# Patient Record
Sex: FEMALE | Race: WHITE | NOT HISPANIC OR LATINO | Employment: PART TIME | URBAN - METROPOLITAN AREA
[De-identification: names, ages, dates, MRNs, and addresses within clinical notes are randomized per-mention and may not be internally consistent; named-entity substitution may affect disease eponyms.]

---

## 2018-08-22 ENCOUNTER — CONVERSION ENCOUNTER (OUTPATIENT)
Dept: SURGERY | Facility: CLINIC | Age: 38
End: 2018-08-22

## 2018-08-22 ENCOUNTER — OFFICE VISIT CONVERTED (OUTPATIENT)
Dept: UROLOGY | Facility: CLINIC | Age: 38
End: 2018-08-22
Attending: UROLOGY

## 2018-10-17 ENCOUNTER — OFFICE VISIT CONVERTED (OUTPATIENT)
Dept: UROLOGY | Facility: CLINIC | Age: 38
End: 2018-10-17
Attending: UROLOGY

## 2018-10-29 ENCOUNTER — OFFICE VISIT CONVERTED (OUTPATIENT)
Dept: CARDIOLOGY | Facility: CLINIC | Age: 38
End: 2018-10-29
Attending: INTERNAL MEDICINE

## 2019-05-08 ENCOUNTER — CONVERSION ENCOUNTER (OUTPATIENT)
Dept: CARDIOLOGY | Facility: CLINIC | Age: 39
End: 2019-05-08
Attending: INTERNAL MEDICINE

## 2019-07-12 ENCOUNTER — HOSPITAL ENCOUNTER (OUTPATIENT)
Dept: GENERAL RADIOLOGY | Facility: HOSPITAL | Age: 39
Discharge: HOME | End: 2019-07-12
Attending: OBSTETRICS & GYNECOLOGY

## 2019-08-21 ENCOUNTER — HOSPITAL ENCOUNTER (OUTPATIENT)
Dept: GENERAL RADIOLOGY | Facility: HOSPITAL | Age: 39
Discharge: HOME | End: 2019-08-21
Attending: OBSTETRICS & GYNECOLOGY

## 2019-09-09 ENCOUNTER — OFFICE VISIT CONVERTED (OUTPATIENT)
Dept: CARDIOLOGY | Facility: CLINIC | Age: 39
End: 2019-09-09
Attending: INTERNAL MEDICINE

## 2019-09-09 ENCOUNTER — CONVERSION ENCOUNTER (OUTPATIENT)
Dept: OTHER | Facility: HOSPITAL | Age: 39
End: 2019-09-09

## 2019-11-26 ENCOUNTER — HOSPITAL ENCOUNTER (OUTPATIENT)
Dept: GENERAL RADIOLOGY | Facility: HOSPITAL | Age: 39
Discharge: HOME | End: 2019-11-26
Attending: NURSE PRACTITIONER

## 2019-12-02 ENCOUNTER — OFFICE VISIT CONVERTED (OUTPATIENT)
Dept: SURGERY | Facility: CLINIC | Age: 39
End: 2019-12-02
Attending: SURGERY

## 2020-03-06 ENCOUNTER — HOSPITAL ENCOUNTER (OUTPATIENT)
Dept: GENERAL RADIOLOGY | Facility: HOSPITAL | Age: 40
Discharge: HOME | End: 2020-03-06
Attending: INTERNAL MEDICINE

## 2020-05-11 ENCOUNTER — TELEMEDICINE CONVERTED (OUTPATIENT)
Dept: CARDIOLOGY | Facility: CLINIC | Age: 40
End: 2020-05-11
Attending: INTERNAL MEDICINE

## 2020-08-10 ENCOUNTER — OFFICE VISIT CONVERTED (OUTPATIENT)
Dept: CARDIOLOGY | Facility: CLINIC | Age: 40
End: 2020-08-10
Attending: INTERNAL MEDICINE

## 2021-02-09 ENCOUNTER — HOSPITAL ENCOUNTER (OUTPATIENT)
Dept: GENERAL RADIOLOGY | Facility: HOSPITAL | Age: 41
Discharge: HOME | End: 2021-02-09
Attending: OBSTETRICS & GYNECOLOGY

## 2021-05-10 NOTE — PROCEDURES
Procedure Note      Patient Name: Rufina Smith   Patient ID: [id number]   Sex: Female   YOB: 1980    Primary Care Provider: Jacki Goldstein MD   Referring Provider: Patricio CHINO    Visit Date: August 10, 2020    Provider: Nolvia Dent MD   Location: [facility]   Location Address: [address] [place] [address], [facility]   [place]  [id number]   Location Phone: [phone]          FINAL REPORT   HOLTER MONITOR REPORT  Date: 08/10/2020   Indication: Palpitations, bradycardia.      The patient underwent 48 hours of Holter monitoring. 95% of the data was analyzable.  Minimum heart rate of 52 beats per minute occurred at 3:44 AM on 08/11/2020.  Maximum heart rate of 130 beats per minute occurred at 1:44 PM on 08/10/2020.  Average heart rate was 77 beats per minute.  There were 10,005 PVCs which represented 5.2% of all beats.  There were 5 PACs.  There was one couplet. No sustained ventricular or supraventricular arrhythmias noted.  No two-second pauses noted.  The patient did not record any symptoms in the diary.    CONCLUSION:  48 hours of Holter monitor reveals sinus rhythm with moderate frequency PVCs and no significant supraventricular or ventricular tachy- or bradyarrhythmias noted.  No significant bradycardia noted.      Nolvia Dent MD, [facility]  PM/pap    This note was transcribed by Ivett Fernandez.  pap/pm  The above service was transcribed by Ivett Fernandez, and I attest to the accuracy of the note.  PM                 Electronically Signed by: Carina Fernandez-, Other -Author on August 17, 2020 08:36:46 AM  Electronically Co-signed by: Nolvia Dent MD -Reviewer on August 18, 2020 02:43:30 PM

## 2021-05-13 NOTE — PROGRESS NOTES
Quick Note      Patient Name: Rufina Smith   Patient ID: [id number]   Sex: Female   YOB: 1980    Primary Care Provider: Jacki Goldstein MD   Referring Provider: Patricio CHINO    Visit Date: May 11, 2020    Provider: Nolvia Dent MD   Location: [facility]   Location Address: [address] [place] [address], [facility]   [place]  [id number]   Location Phone: [phone]          History Of Present Illness  TELEHEALTH TELEPHONE VISIT  Chief Complaint: Palpitations, Chest pain   Rufina Smith is a 40 year old /White female who is presenting for evaluation via telehealth telephone visit. Verbal consent obtained before beginning visit. Telehealth visit due to COVID-19. Over the last 3 months, she has been having palpitations, described as maybe 3 nights a week when she lays down at night. It feels like her heart slows down and then it skips, can last for 30 minutes to an hour, usually improves when she changes the position she is sleeping in. She is having chest pain similar to what she was having a year ago. It had resolved but came back about 2-3 months ago. It occurs maybe once or twice a month in the mid chest area, lasting a few minutes, not associated with any activities without any associated symptoms. She has some shortness of breath when she lies down with these episodes, but denies any swelling. She has occasional dizziness, but no syncope, PND, or orthopnea. She is trying to lose weight, so she is down 9 pounds since her last visit. She has no way to check her blood pressure, but they tend to be on the low side.   Provider spent 11 minutes with the patient during telehealth visit.   The following staff were present during this visit: Provider only.   Past Medical History/Overview of Patient Symptoms     PAST MEDICAL HISTORY:  Positive for pituitary tumor and mitral valve prolapse. Hospitalizations include a dilation and curettage and kidney stone removed.  She has a history of non-sustained supraventricular tachyarrhythmias and symptomatic APCs and PVCs.     FAMILY HISTORY:  Positive for diabetes mellitus and heart disease.  Negative for hypertension.      PSYCHOSOCIAL HISTORY:  Denies alcohol use.  Previously smoked, but quit 13 years ago.      CURRENT MEDICATIONS:  Cabergoline 0.5 mg every week.  Dosage and frequency of the medication was reviewed with the patient.      REVIEW OF SYSTEMS:  Cardiovascular:  Admits chest pain or angina pectoris, palpitations (fast, fluttering, or skipping beats), shortness of breath while walking or lying flat; Denies swelling (feet, ankles, hands)  Respiratory: Denies chronic or frequent cough, asthma or wheezing       Vitals     Patient unable to obtain vitals.    Weight 142.       Physical Examination  · Labs  o Labs  o : No labs recently.          Assessment     1.  Symptomatic palpitations.  2.  Hypotension.  3.  Mitral valve prolapse.    4.  Atypical chest pain.       Plan     1.  We will do a 24-hour Holter monitor to evaluate her palpitations.  2.  Check a CBC, CMP, a.m. cortisol, and magnesium level.  The rest of her treatment will be based on the        results.      MATTI Narayanan/Nolvia Dent MD, [facility]  JF/PM/vm               Electronically Signed by: Nely Champion-, Other -Author on May 18, 2020 11:58:32 AM  Electronically Co-signed by: MATTI Hernandez -Reviewer on May 21, 2020 01:39:06 PM  Electronically Co-signed by: Nolvia Dent MD -Reviewer on May 26, 2020 10:18:58 AM

## 2021-05-15 VITALS — HEIGHT: 67 IN | RESPIRATION RATE: 14 BRPM | WEIGHT: 149.5 LBS | BODY MASS INDEX: 23.46 KG/M2

## 2021-05-15 VITALS
HEART RATE: 78 BPM | BODY MASS INDEX: 23.7 KG/M2 | WEIGHT: 151 LBS | HEIGHT: 67 IN | SYSTOLIC BLOOD PRESSURE: 100 MMHG | DIASTOLIC BLOOD PRESSURE: 62 MMHG

## 2021-05-16 VITALS — WEIGHT: 160 LBS | HEIGHT: 67 IN | RESPIRATION RATE: 14 BRPM | BODY MASS INDEX: 25.11 KG/M2

## 2021-05-16 VITALS
DIASTOLIC BLOOD PRESSURE: 70 MMHG | SYSTOLIC BLOOD PRESSURE: 100 MMHG | HEIGHT: 67 IN | BODY MASS INDEX: 25.27 KG/M2 | WEIGHT: 161 LBS | HEART RATE: 70 BPM

## 2021-05-16 VITALS — WEIGHT: 161 LBS | BODY MASS INDEX: 25.27 KG/M2 | HEIGHT: 67 IN | RESPIRATION RATE: 14 BRPM

## 2021-10-22 ENCOUNTER — TRANSCRIBE ORDERS (OUTPATIENT)
Dept: ADMINISTRATIVE | Facility: HOSPITAL | Age: 41
End: 2021-10-22

## 2021-10-22 DIAGNOSIS — H53.9 CHANGES IN VISION: Primary | ICD-10-CM

## 2021-10-28 ENCOUNTER — APPOINTMENT (OUTPATIENT)
Dept: CARDIOLOGY | Facility: HOSPITAL | Age: 41
End: 2021-10-28

## 2021-12-07 ENCOUNTER — TELEPHONE (OUTPATIENT)
Dept: OBSTETRICS AND GYNECOLOGY | Facility: CLINIC | Age: 41
End: 2021-12-07

## 2021-12-07 DIAGNOSIS — Z12.31 BREAST CANCER SCREENING BY MAMMOGRAM: Primary | ICD-10-CM

## 2022-02-25 ENCOUNTER — APPOINTMENT (OUTPATIENT)
Dept: MAMMOGRAPHY | Facility: HOSPITAL | Age: 42
End: 2022-02-25

## 2022-03-10 ENCOUNTER — TRANSCRIBE ORDERS (OUTPATIENT)
Dept: ADMINISTRATIVE | Facility: HOSPITAL | Age: 42
End: 2022-03-10

## 2022-03-10 DIAGNOSIS — R10.13 EPIGASTRIC PAIN: ICD-10-CM

## 2022-03-10 DIAGNOSIS — R10.31 RIGHT LOWER QUADRANT PAIN: Primary | ICD-10-CM

## 2022-03-25 ENCOUNTER — APPOINTMENT (OUTPATIENT)
Dept: CT IMAGING | Facility: HOSPITAL | Age: 42
End: 2022-03-25

## 2022-04-06 ENCOUNTER — APPOINTMENT (OUTPATIENT)
Dept: ULTRASOUND IMAGING | Facility: HOSPITAL | Age: 42
End: 2022-04-06

## 2022-04-13 ENCOUNTER — HOSPITAL ENCOUNTER (OUTPATIENT)
Dept: CT IMAGING | Facility: HOSPITAL | Age: 42
Discharge: HOME | End: 2022-04-13
Admitting: NURSE PRACTITIONER

## 2022-04-13 DIAGNOSIS — R10.31 RIGHT LOWER QUADRANT PAIN: ICD-10-CM

## 2022-04-13 PROCEDURE — 0 IOPAMIDOL PER 1 ML: Performed by: NURSE PRACTITIONER

## 2022-04-13 PROCEDURE — 74177 CT ABD & PELVIS W/CONTRAST: CPT

## 2022-04-13 RX ADMIN — IOPAMIDOL 100 ML: 755 INJECTION, SOLUTION INTRAVENOUS at 08:55

## 2022-04-29 ENCOUNTER — HOSPITAL ENCOUNTER (OUTPATIENT)
Dept: ULTRASOUND IMAGING | Facility: HOSPITAL | Age: 42
Discharge: HOME | End: 2022-04-29

## 2022-04-29 ENCOUNTER — HOSPITAL ENCOUNTER (OUTPATIENT)
Dept: MAMMOGRAPHY | Facility: HOSPITAL | Age: 42
Discharge: HOME | End: 2022-04-29

## 2022-04-29 DIAGNOSIS — R10.13 EPIGASTRIC PAIN: ICD-10-CM

## 2022-04-29 DIAGNOSIS — Z12.31 BREAST CANCER SCREENING BY MAMMOGRAM: ICD-10-CM

## 2022-04-29 PROCEDURE — 76705 ECHO EXAM OF ABDOMEN: CPT

## 2022-04-29 PROCEDURE — 77063 BREAST TOMOSYNTHESIS BI: CPT

## 2022-04-29 PROCEDURE — 77067 SCR MAMMO BI INCL CAD: CPT

## 2022-05-02 ENCOUNTER — APPOINTMENT (OUTPATIENT)
Dept: MAMMOGRAPHY | Facility: HOSPITAL | Age: 42
End: 2022-05-02

## 2022-08-31 RX ORDER — CABERGOLINE 0.5 MG/1
TABLET ORAL
COMMUNITY

## 2022-08-31 RX ORDER — NORGESTREL-ETHINYL ESTRADIOL 0.3-0.03MG
TABLET ORAL
COMMUNITY
End: 2022-12-29

## 2022-08-31 RX ORDER — COVID-19 MOLECULAR TEST ASSAY
KIT MISCELLANEOUS
COMMUNITY
Start: 2022-06-24 | End: 2022-09-15

## 2022-08-31 RX ORDER — NORETHINDRONE ACETATE AND ETHINYL ESTRADIOL 1MG-20(21)
1 KIT ORAL DAILY
COMMUNITY
Start: 2022-07-10 | End: 2022-09-15

## 2022-08-31 RX ORDER — SULFAMETHOXAZOLE AND TRIMETHOPRIM 800; 160 MG/1; MG/1
1 TABLET ORAL 2 TIMES DAILY
COMMUNITY
Start: 2022-07-06 | End: 2022-09-15

## 2022-09-02 ENCOUNTER — OFFICE VISIT (OUTPATIENT)
Dept: SURGERY | Facility: CLINIC | Age: 42
End: 2022-09-02

## 2022-09-02 ENCOUNTER — PREP FOR SURGERY (OUTPATIENT)
Dept: OTHER | Facility: HOSPITAL | Age: 42
End: 2022-09-02

## 2022-09-02 VITALS — RESPIRATION RATE: 14 BRPM | BODY MASS INDEX: 24.96 KG/M2 | WEIGHT: 159 LBS | HEIGHT: 67 IN

## 2022-09-02 DIAGNOSIS — K82.8 BILIARY DYSKINESIA: Primary | ICD-10-CM

## 2022-09-02 PROCEDURE — 99203 OFFICE O/P NEW LOW 30 MIN: CPT | Performed by: SURGERY

## 2022-09-02 RX ORDER — INDOCYANINE GREEN AND WATER 25 MG
1.25 KIT INJECTION ONCE
Status: CANCELLED | OUTPATIENT
Start: 2022-09-02 | End: 2022-09-02

## 2022-09-02 RX ORDER — ONDANSETRON 2 MG/ML
4 INJECTION INTRAMUSCULAR; INTRAVENOUS EVERY 6 HOURS PRN
Status: CANCELLED | OUTPATIENT
Start: 2022-09-02

## 2022-09-02 RX ORDER — SODIUM CHLORIDE, SODIUM LACTATE, POTASSIUM CHLORIDE, CALCIUM CHLORIDE 600; 310; 30; 20 MG/100ML; MG/100ML; MG/100ML; MG/100ML
70 INJECTION, SOLUTION INTRAVENOUS CONTINUOUS
Status: CANCELLED | OUTPATIENT
Start: 2022-09-02

## 2022-09-02 NOTE — PROGRESS NOTES
Inpatient History and Physical Surgical Orders    Preadmission Location:   Preadmission Time:  Facility:  Surgery Date:  Surgery Time:  Preadmission Test date:     Chief Complaint  Outpatient History and Physical / Surgical Orders    Primary Care Provider: Patricio Law APRN    Referring Provider: Patricio Law APRN    Subjective      Patient Name: Rufina Smith : 1980    HPI  The patient is a 42-year-old female who presents with chronic biliary colic type pain.  She describes right upper quadrant pain which radiates back to the flank after most meals.  She has to eat blander she has more significant pain.  The episodes can last upwards of an hour at times.  She has had an ultrasound that showed no gallstones but then had a DISIDA scan that showed a reduced gallbladder ejection fraction.    Past History:  Medical History: has a past medical history of Cholelithiasis.   Surgical History: has no past surgical history on file.   Family History: family history is not on file.   Social History: reports that she has never smoked. She has never used smokeless tobacco.  Allergies: Patient has no known allergies.       Current Outpatient Medications:   •  BinaxNOW COVID-19 Ag Home Test kit, TEST AS DIRECTED TODAY, Disp: , Rfl:   •  Blisovi FE  1-20 MG-MCG per tablet, Take 1 tablet by mouth Daily., Disp: , Rfl:   •  cabergoline (DOSTINEX) 0.5 MG tablet, cabergoline 0.5 mg oral tablet take 2 tablets (1 mg) by oral route twice weekly   Active, Disp: , Rfl:   •  norgestrel-ethinyl estradiol (Cryselle-28) 0.3-30 MG-MCG per tablet, Cryselle (28) 0.3-30 mg-mcg oral tablet take 1 tablet by oral route once daily   Active, Disp: , Rfl:   •  sertraline (ZOLOFT) 50 MG tablet, Take 50 mg by mouth Daily., Disp: , Rfl:   •  sulfamethoxazole-trimethoprim (BACTRIM DS,SEPTRA DS) 800-160 MG per tablet, Take 1 tablet by mouth 2 (Two) Times a Day. for 10 days, Disp: , Rfl:        Objective   Vital Signs:   Resp 14   Ht 170.2  "cm (67\")   Wt 72.1 kg (159 lb)   BMI 24.90 kg/m²       Physical Exam  Vitals and nursing note reviewed.   Constitutional:       Appearance: Normal appearance. The patient is well-developed.   Cardiovascular:      Rate and Rhythm: Normal rate and regular rhythm.   Pulmonary:      Effort: Pulmonary effort is normal.      Breath sounds: Normal air entry.   Abdominal:      General: Bowel sounds are normal.      Palpations: Abdomen is soft.      Skin:     General: Skin is warm and dry.   Neurological:      Mental Status: The patient is alert and oriented to person, place, and time.      Motor: Motor function is intact.   Psychiatric:         Mood and Affect: Mood normal.       Result Review :               Assessment and Plan   Diagnoses and all orders for this visit:    1. Biliary dyskinesia (Primary)    We will schedule her for a laparoscopic cholecystectomy.  I have described the procedure to her as well as the risk and benefits and she is agreeable to proceeding.    I  Efrain Mccormack MD  09/02/2022    "

## 2022-09-22 ENCOUNTER — ANESTHESIA EVENT (OUTPATIENT)
Dept: PERIOP | Facility: HOSPITAL | Age: 42
End: 2022-09-22

## 2022-09-22 ENCOUNTER — ANESTHESIA (OUTPATIENT)
Dept: PERIOP | Facility: HOSPITAL | Age: 42
End: 2022-09-22

## 2022-09-22 ENCOUNTER — HOSPITAL ENCOUNTER (OUTPATIENT)
Facility: HOSPITAL | Age: 42
Setting detail: HOSPITAL OUTPATIENT SURGERY
Discharge: HOME | End: 2022-09-22
Attending: SURGERY | Admitting: SURGERY

## 2022-09-22 VITALS
HEIGHT: 67 IN | RESPIRATION RATE: 16 BRPM | TEMPERATURE: 97.9 F | BODY MASS INDEX: 25.12 KG/M2 | SYSTOLIC BLOOD PRESSURE: 115 MMHG | OXYGEN SATURATION: 97 % | HEART RATE: 51 BPM | DIASTOLIC BLOOD PRESSURE: 69 MMHG | WEIGHT: 160.05 LBS

## 2022-09-22 DIAGNOSIS — K82.8 BILIARY DYSKINESIA: ICD-10-CM

## 2022-09-22 LAB — B-HCG UR QL: NEGATIVE

## 2022-09-22 PROCEDURE — 47562 LAPAROSCOPIC CHOLECYSTECTOMY: CPT | Performed by: SPECIALIST/TECHNOLOGIST, OTHER

## 2022-09-22 PROCEDURE — 25010000002 ONDANSETRON PER 1 MG: Performed by: SURGERY

## 2022-09-22 PROCEDURE — C1889 IMPLANT/INSERT DEVICE, NOC: HCPCS | Performed by: SURGERY

## 2022-09-22 PROCEDURE — 25010000002 ONDANSETRON PER 1 MG: Performed by: NURSE ANESTHETIST, CERTIFIED REGISTERED

## 2022-09-22 PROCEDURE — 25010000002 HYDROMORPHONE 1 MG/ML SOLUTION: Performed by: SURGERY

## 2022-09-22 PROCEDURE — 25010000002 PROCHLORPERAZINE 10 MG/2ML SOLUTION: Performed by: ANESTHESIOLOGY

## 2022-09-22 PROCEDURE — 47562 LAPAROSCOPIC CHOLECYSTECTOMY: CPT | Performed by: SURGERY

## 2022-09-22 PROCEDURE — 25010000002 FENTANYL CITRATE (PF) 50 MCG/ML SOLUTION: Performed by: NURSE ANESTHETIST, CERTIFIED REGISTERED

## 2022-09-22 PROCEDURE — 25010000002 DEXAMETHASONE PER 1 MG: Performed by: NURSE ANESTHETIST, CERTIFIED REGISTERED

## 2022-09-22 PROCEDURE — 81025 URINE PREGNANCY TEST: CPT | Performed by: ANESTHESIOLOGY

## 2022-09-22 PROCEDURE — 88304 TISSUE EXAM BY PATHOLOGIST: CPT | Performed by: SURGERY

## 2022-09-22 PROCEDURE — 25010000002 PROPOFOL 10 MG/ML EMULSION: Performed by: NURSE ANESTHETIST, CERTIFIED REGISTERED

## 2022-09-22 PROCEDURE — 0 HYDROMORPHONE 1 MG/ML SOLUTION: Performed by: NURSE ANESTHETIST, CERTIFIED REGISTERED

## 2022-09-22 PROCEDURE — 25010000002 MIDAZOLAM PER 1 MG: Performed by: ANESTHESIOLOGY

## 2022-09-22 DEVICE — LIGACLIP 10-M/L, 10MM ENDOSCOPIC ROTATING MULTIPLE CLIP APPLIERS
Type: IMPLANTABLE DEVICE | Site: ABDOMEN | Status: FUNCTIONAL
Brand: LIGACLIP

## 2022-09-22 RX ORDER — PROCHLORPERAZINE EDISYLATE 5 MG/ML
10 INJECTION INTRAMUSCULAR; INTRAVENOUS ONCE
Status: COMPLETED | OUTPATIENT
Start: 2022-09-22 | End: 2022-09-22

## 2022-09-22 RX ORDER — MIDAZOLAM HYDROCHLORIDE 1 MG/ML
2 INJECTION INTRAMUSCULAR; INTRAVENOUS ONCE
Status: COMPLETED | OUTPATIENT
Start: 2022-09-22 | End: 2022-09-22

## 2022-09-22 RX ORDER — SCOLOPAMINE TRANSDERMAL SYSTEM 1 MG/1
1 PATCH, EXTENDED RELEASE TRANSDERMAL ONCE
Status: DISCONTINUED | OUTPATIENT
Start: 2022-09-22 | End: 2022-09-22 | Stop reason: HOSPADM

## 2022-09-22 RX ORDER — SODIUM CHLORIDE, SODIUM LACTATE, POTASSIUM CHLORIDE, CALCIUM CHLORIDE 600; 310; 30; 20 MG/100ML; MG/100ML; MG/100ML; MG/100ML
9 INJECTION, SOLUTION INTRAVENOUS CONTINUOUS PRN
Status: DISCONTINUED | OUTPATIENT
Start: 2022-09-22 | End: 2022-09-22 | Stop reason: HOSPADM

## 2022-09-22 RX ORDER — ROCURONIUM BROMIDE 10 MG/ML
INJECTION, SOLUTION INTRAVENOUS AS NEEDED
Status: DISCONTINUED | OUTPATIENT
Start: 2022-09-22 | End: 2022-09-22 | Stop reason: SURG

## 2022-09-22 RX ORDER — DEXAMETHASONE SODIUM PHOSPHATE 4 MG/ML
INJECTION, SOLUTION INTRA-ARTICULAR; INTRALESIONAL; INTRAMUSCULAR; INTRAVENOUS; SOFT TISSUE AS NEEDED
Status: DISCONTINUED | OUTPATIENT
Start: 2022-09-22 | End: 2022-09-22 | Stop reason: SURG

## 2022-09-22 RX ORDER — IBUPROFEN 600 MG/1
600 TABLET ORAL EVERY 6 HOURS PRN
Status: DISCONTINUED | OUTPATIENT
Start: 2022-09-22 | End: 2022-09-22 | Stop reason: HOSPADM

## 2022-09-22 RX ORDER — ACETAMINOPHEN 500 MG
1000 TABLET ORAL ONCE
Status: COMPLETED | OUTPATIENT
Start: 2022-09-22 | End: 2022-09-22

## 2022-09-22 RX ORDER — ONDANSETRON 4 MG/1
4 TABLET, FILM COATED ORAL ONCE AS NEEDED
Status: DISCONTINUED | OUTPATIENT
Start: 2022-09-22 | End: 2022-09-22 | Stop reason: HOSPADM

## 2022-09-22 RX ORDER — ONDANSETRON 2 MG/ML
4 INJECTION INTRAMUSCULAR; INTRAVENOUS ONCE AS NEEDED
Status: DISCONTINUED | OUTPATIENT
Start: 2022-09-22 | End: 2022-09-22 | Stop reason: HOSPADM

## 2022-09-22 RX ORDER — HYDROCODONE BITARTRATE AND ACETAMINOPHEN 5; 325 MG/1; MG/1
1-2 TABLET ORAL EVERY 4 HOURS PRN
Qty: 10 TABLET | Refills: 0 | Status: SHIPPED | OUTPATIENT
Start: 2022-09-22 | End: 2022-12-29

## 2022-09-22 RX ORDER — LIDOCAINE HYDROCHLORIDE 20 MG/ML
INJECTION, SOLUTION EPIDURAL; INFILTRATION; INTRACAUDAL; PERINEURAL AS NEEDED
Status: DISCONTINUED | OUTPATIENT
Start: 2022-09-22 | End: 2022-09-22 | Stop reason: SURG

## 2022-09-22 RX ORDER — FENTANYL CITRATE 50 UG/ML
INJECTION, SOLUTION INTRAMUSCULAR; INTRAVENOUS AS NEEDED
Status: DISCONTINUED | OUTPATIENT
Start: 2022-09-22 | End: 2022-09-22 | Stop reason: SURG

## 2022-09-22 RX ORDER — BUPIVACAINE HYDROCHLORIDE AND EPINEPHRINE 2.5; 5 MG/ML; UG/ML
INJECTION, SOLUTION EPIDURAL; INFILTRATION; INTRACAUDAL; PERINEURAL AS NEEDED
Status: DISCONTINUED | OUTPATIENT
Start: 2022-09-22 | End: 2022-09-22 | Stop reason: HOSPADM

## 2022-09-22 RX ORDER — SODIUM CHLORIDE, SODIUM LACTATE, POTASSIUM CHLORIDE, CALCIUM CHLORIDE 600; 310; 30; 20 MG/100ML; MG/100ML; MG/100ML; MG/100ML
70 INJECTION, SOLUTION INTRAVENOUS CONTINUOUS
Status: DISCONTINUED | OUTPATIENT
Start: 2022-09-22 | End: 2022-09-22 | Stop reason: HOSPADM

## 2022-09-22 RX ORDER — INDOCYANINE GREEN AND WATER 25 MG
1.25 KIT INJECTION ONCE
Status: COMPLETED | OUTPATIENT
Start: 2022-09-22 | End: 2022-09-22

## 2022-09-22 RX ORDER — ONDANSETRON 2 MG/ML
4 INJECTION INTRAMUSCULAR; INTRAVENOUS EVERY 6 HOURS PRN
Status: DISCONTINUED | OUTPATIENT
Start: 2022-09-22 | End: 2022-09-22 | Stop reason: HOSPADM

## 2022-09-22 RX ORDER — MAGNESIUM HYDROXIDE 1200 MG/15ML
LIQUID ORAL AS NEEDED
Status: DISCONTINUED | OUTPATIENT
Start: 2022-09-22 | End: 2022-09-22 | Stop reason: HOSPADM

## 2022-09-22 RX ORDER — PROPOFOL 10 MG/ML
VIAL (ML) INTRAVENOUS AS NEEDED
Status: DISCONTINUED | OUTPATIENT
Start: 2022-09-22 | End: 2022-09-22 | Stop reason: SURG

## 2022-09-22 RX ORDER — ONDANSETRON 2 MG/ML
INJECTION INTRAMUSCULAR; INTRAVENOUS AS NEEDED
Status: DISCONTINUED | OUTPATIENT
Start: 2022-09-22 | End: 2022-09-22 | Stop reason: SURG

## 2022-09-22 RX ORDER — HYDROCODONE BITARTRATE AND ACETAMINOPHEN 5; 325 MG/1; MG/1
1 TABLET ORAL ONCE AS NEEDED
Status: DISCONTINUED | OUTPATIENT
Start: 2022-09-22 | End: 2022-09-22 | Stop reason: HOSPADM

## 2022-09-22 RX ADMIN — ONDANSETRON 4 MG: 2 INJECTION INTRAMUSCULAR; INTRAVENOUS at 08:39

## 2022-09-22 RX ADMIN — HYDROMORPHONE HYDROCHLORIDE 0.5 MG: 1 INJECTION, SOLUTION INTRAMUSCULAR; INTRAVENOUS; SUBCUTANEOUS at 08:21

## 2022-09-22 RX ADMIN — HYDROMORPHONE HYDROCHLORIDE 0.5 MG: 1 INJECTION, SOLUTION INTRAMUSCULAR; INTRAVENOUS; SUBCUTANEOUS at 08:41

## 2022-09-22 RX ADMIN — DEXAMETHASONE SODIUM PHOSPHATE 4 MG: 4 INJECTION, SOLUTION INTRA-ARTICULAR; INTRALESIONAL; INTRAMUSCULAR; INTRAVENOUS; SOFT TISSUE at 07:41

## 2022-09-22 RX ADMIN — ROCURONIUM BROMIDE 50 MG: 10 INJECTION INTRAVENOUS at 07:35

## 2022-09-22 RX ADMIN — MIDAZOLAM HYDROCHLORIDE 2 MG: 1 INJECTION, SOLUTION INTRAMUSCULAR; INTRAVENOUS at 07:19

## 2022-09-22 RX ADMIN — PROPOFOL 150 MG: 10 INJECTION, EMULSION INTRAVENOUS at 07:35

## 2022-09-22 RX ADMIN — HYDROMORPHONE HYDROCHLORIDE 0.5 MG: 1 INJECTION, SOLUTION INTRAMUSCULAR; INTRAVENOUS; SUBCUTANEOUS at 08:16

## 2022-09-22 RX ADMIN — SODIUM CHLORIDE, POTASSIUM CHLORIDE, SODIUM LACTATE AND CALCIUM CHLORIDE 9 ML/HR: 600; 310; 30; 20 INJECTION, SOLUTION INTRAVENOUS at 06:50

## 2022-09-22 RX ADMIN — ACETAMINOPHEN 1000 MG: 500 TABLET, FILM COATED ORAL at 06:50

## 2022-09-22 RX ADMIN — PROCHLORPERAZINE EDISYLATE 10 MG: 5 INJECTION INTRAMUSCULAR; INTRAVENOUS at 09:07

## 2022-09-22 RX ADMIN — INDOCYANINE GREEN AND WATER 1.25 MG: KIT at 06:51

## 2022-09-22 RX ADMIN — ONDANSETRON 4 MG: 2 INJECTION INTRAMUSCULAR; INTRAVENOUS at 07:41

## 2022-09-22 RX ADMIN — SCOPALAMINE 1 PATCH: 1 PATCH, EXTENDED RELEASE TRANSDERMAL at 06:50

## 2022-09-22 RX ADMIN — FENTANYL CITRATE 100 MCG: 50 INJECTION, SOLUTION INTRAMUSCULAR; INTRAVENOUS at 07:35

## 2022-09-22 RX ADMIN — LIDOCAINE HYDROCHLORIDE 50 MG: 20 INJECTION, SOLUTION EPIDURAL; INFILTRATION; INTRACAUDAL; PERINEURAL at 07:35

## 2022-09-22 RX ADMIN — SUGAMMADEX 200 MG: 100 INJECTION, SOLUTION INTRAVENOUS at 08:14

## 2022-09-22 NOTE — ANESTHESIA POSTPROCEDURE EVALUATION
Patient: Rufina Smith    Procedure Summary     Date: 09/22/22 Room / Location: [facility] OR  / [facility]    Anesthesia Start: 0724 Anesthesia Stop: 0829    Procedure: CHOLECYSTECTOMY LAPAROSCOPIC (N/A Abdomen) Diagnosis:       Biliary dyskinesia      (Biliary dyskinesia [K82.8])    Surgeons: Efrain Mccormack MD Provider: Fran Contreras MD    Anesthesia Type: general ASA Status: 2          Anesthesia Type: general    Vitals  Vitals Value Taken Time   /65 09/22/22 0853   Temp 36.7 °C (98 °F) 09/22/22 0825   Pulse 83 09/22/22 0902   Resp 16 09/22/22 0840   SpO2 97 % 09/22/22 0902   Vitals shown include unvalidated device data.        Post Anesthesia Care and Evaluation    Patient location during evaluation: bedside  Patient participation: complete - patient participated  Level of consciousness: awake  Pain management: adequate    Airway patency: patent  Anesthetic complications: No anesthetic complications  PONV Status: none  Cardiovascular status: acceptable  Respiratory status: acceptable  Hydration status: acceptable    Comments: An Anesthesiologist personally participated in the most demanding procedures (including induction and emergence if applicable) in the anesthesia plan, monitored the course of anesthesia administration at frequent intervals and remained physically present and available for immediate diagnosis and treatment of emergencies.

## 2022-09-22 NOTE — ADDENDUM NOTE
Addendum  created 09/22/22 0904 by Fran Contreras MD    Order list changed, Pharmacy for encounter modified

## 2022-09-22 NOTE — DISCHARGE INSTRUCTIONS
DISCHARGE INSTRUCTIONS LAPAROSCOPIC CHOLECYSTECTOMY/APPENDECTOMY  (GALL BLADDER)      For your surgery you had:  General anesthesia (you may have a sore throat for the first 24 hours)  IV sedation  Local anesthesia  You received a medicated patch for nausea prevention today (behind your ear). It is recommended that you remove it 24-48 hours post-operatively. It must be removed within 72 hours.  You received an anesthesia medication today that can cause hormonal forms of birth control to be ineffective. You should use a different form of birth control (to prevent pregnancy) for 7 days.   You may experience dizziness, drowsiness, or light-headedness for several hours following surgery.  Do not stay alone tonight.  Limit your activity for 24 hours.  Resume your diet slowly.  Follow whatever special dietary instructions you may have been given by your doctor.  You should not drive, operate machinery, drink alcohol, or sign legally binding documents for 24 hours or while you are taking pain medication.  Last dose of pain medication was given at:   .    NOTIFY YOUR DOCTOR IF YOU EXPERIENCE ANY OF THE FOLLOWING:  Temperature greater than 101 degrees Fahrenheit  Shaking Chills  Redness or excessive drainage from incision  Nausea, vomiting and/or pain that is not controlled by prescribed medications  Increase in bleeding or bleeding that is excessive  Unable to urinate in 6 hours after surgery  If unable to reach your doctor, please go to the closest Emergency Room You may remove dressing  Saturday .  You may shower Saturday  .  Apply an ice pack 24-48 hours.  You may experience gas discomfort 24-48 hours after discharge, especially in chest and shoulders.  Changing position frequently may alleviate this discomfort.  If you have excessive pain, swelling, redness, drainage or other problems, notify your physician.  If unable to urinate in 6 to 8 hours after surgery or urinating frequently in small amounts, notify your  doctor or go to the nearest Emergency Room.  Medications per physician instructions as indicated on Discharge Medication Information Sheet.  You should see Dr. Mccormack for follow-up care  In 2 Weeks .  Phone number:      SPECIAL INSTRUCTIONS:                        I have read and received the above instructions.     Patient/Responsible Party's Signature Date/Time     RN Signature Date/Time

## 2022-09-22 NOTE — OP NOTE
CHOLECYSTECTOMY LAPAROSCOPIC  Procedure Report    Patient Name:  Rufina Smith  YOB: 1980    Date of Surgery:  9/22/2022     Indications: The patient is a 42-year-old female who presented with chronic cholecystitis.  The decision was made to proceed with a laparoscopic cholecystectomy.    Pre-op Diagnosis: Chronic cholecystitis    Post-Op Diagnosis: Same    Procedure/CPT® Codes:    Laparoscopic cholecystectomy    Staff:  Surgeon(s):  Efrain Mccormack MD    Assistant: Luisa Braswell CSA    Anesthesia: General    Estimated Blood Loss: 5 mL    Implants:    Implant Name Type Inv. Item Serial No.  Lot No. LRB No. Used Action   CLIPAPPLR M/ ENDO LIGACLIP ROT 10MM MD/LG - [id number] Implant CLIPAPPLR M/ ENDO LIGACLIP ROT 10MM MD/DIMA  [employer]  DIV OF J AND J [id number] N/A 1 Implanted       Specimen:          Specimens     ID Source Type Tests Collected By Collected At Frozen?    A Gallbladder Tissue · TISSUE PATHOLOGY EXAM   Efrain Mccormack MD 9/22/22 0809 No    Description: Gallbladder and contents               Findings: None    Complications: None    Description of Procedure: The patient was taken to the operating room and placed on the table in supine position.  After induction of general endotracheal anesthesia, the abdomen was prepped and draped sterilely.  The abdomen was insufflated with a Veress needle placed above the umbilicus and a 5 mm supraumbilical port was placed into the peritoneal cavity using a Visiport.  A 12 mm epigastric port and two 5 mm right flank ports were then placed under direct vision.  The dome of the gallbladder was grasped and retracted cephalad and the infundibulum was grasped and retracted inferiorly and laterally.  The gallbladder cystic duct junction was then dissected and a critical view of safety was obtained.  The camera was switched over to ICG mode and we clearly saw the cystic duct coming up into the infundibulum of the gallbladder and we  visualized the common bile duct medially.  The cystic duct was then clipped 3 times proximally and once distally and then divided with scissors.  The cystic artery was then dissected and clipped 3 times proximally and once distally and then also divided with scissors.  The gallbladder was then dissected free from the gallbladder fossa with cautery and brought out through the epigastric port site in an Endopouch bag.  The ports were replaced and the operative field was irrigated out with sterile saline.  We appear to have good hemostasis and I saw no evidence of a bile leak.  The epigastric port site was then closed with a Derik-Lance closure device and a 0 Mersilene tie.  The abdomen was desufflated and the other ports were removed.  The skin incisions were closed with 4-0 Vicryl subcuticular sutures.  Sterile dressings were applied and the patient was taken to the postanesthesia recovery room in stable condition.      Assistant: Luisa Braswell CSA  was responsible for performing the following activities: Retraction, Suturing, Placing Dressing and Held/Positioned Camera and their skilled assistance was necessary for the success of this case.    Efrain Mccormack MD     Date: 9/22/2022  Time: 08:26 EDT

## 2022-09-22 NOTE — ANESTHESIA PREPROCEDURE EVALUATION
Anesthesia Evaluation     Patient summary reviewed and Nursing notes reviewed   history of anesthetic complications:               Airway   Mallampati: I  TM distance: >3 FB  Neck ROM: full  No difficulty expected  Dental      Pulmonary - negative pulmonary ROS and normal exam    breath sounds clear to auscultation  Cardiovascular - normal exam    Rhythm: regular  Rate: normal    (+) valvular problems/murmurs,       Neuro/Psych- negative ROS  GI/Hepatic/Renal/Endo    (+)   renal disease,     Musculoskeletal (-) negative ROS    Abdominal    Substance History - negative use     OB/GYN negative ob/gyn ROS         Other                        Anesthesia Plan    ASA 2     general     intravenous induction     Anesthetic plan, risks, benefits, and alternatives have been provided, discussed and informed consent has been obtained with: patient.        CODE STATUS:

## 2022-09-23 LAB
CYTO UR: NORMAL
LAB AP CASE REPORT: NORMAL
LAB AP CLINICAL INFORMATION: NORMAL
PATH REPORT.FINAL DX SPEC: NORMAL
PATH REPORT.GROSS SPEC: NORMAL

## 2022-10-03 RX ORDER — NORETHINDRONE ACETATE AND ETHINYL ESTRADIOL 1MG-20(21)
KIT ORAL
Qty: 84 TABLET | Refills: 0 | Status: SHIPPED | OUTPATIENT
Start: 2022-10-03 | End: 2023-01-03

## 2022-10-03 NOTE — TELEPHONE ENCOUNTER
The patient has an upcoming visit scheduled at the end of December.  She was last seen 08/06/21.  I authorized a 90 day supply of her birth control to last her until her appointment.

## 2022-10-07 ENCOUNTER — OFFICE VISIT (OUTPATIENT)
Dept: SURGERY | Facility: CLINIC | Age: 42
End: 2022-10-07

## 2022-10-07 VITALS — RESPIRATION RATE: 16 BRPM | HEIGHT: 67 IN | BODY MASS INDEX: 24.64 KG/M2 | WEIGHT: 157 LBS

## 2022-10-07 DIAGNOSIS — K82.8 BILIARY DYSKINESIA: Primary | ICD-10-CM

## 2022-10-07 PROCEDURE — 99024 POSTOP FOLLOW-UP VISIT: CPT | Performed by: SURGERY

## 2022-10-07 NOTE — PROGRESS NOTES
"Chief Complaint  Biliary Dyskinesia and Post-op    Subjective          Rufina Smith presents to [facility] GENERAL SURGERY  History of Present Illness    Rufina Smith is a 42 y.o. female  who presents today for a postoperative visit.     Patient is here for a follow-up after a laparoscopic cholecystectomy.  She is doing well and had no complaints.    Past History:  Medical History: has a past medical history of Biliary dyskinesia, Cholelithiasis, Kidney stone, MVP (mitral valve prolapse), Pituitary tumor, and PONV (postoperative nausea and vomiting).   Surgical History: has a past surgical history that includes d & c hysteroscopy; Kidney stone surgery; and Cholecystectomy (N/A, 9/22/2022).   Family History: family history is not on file.   Social History: reports that she has never smoked. She has never used smokeless tobacco.  Allergies: Latex       Current Outpatient Medications:   •  Blisovi FE 1/20 1-20 MG-MCG per tablet, TAKE 1 TABLET BY MOUTH EVERY DAY, Disp: 84 tablet, Rfl: 0  •  cabergoline (DOSTINEX) 0.5 MG tablet, cabergoline 0.5 mg oral tablet take 2 tablets (1 mg) by oral route twice weekly   Active, Disp: , Rfl:   •  HYDROcodone-acetaminophen (NORCO) 5-325 MG per tablet, Take 1-2 tablets by mouth Every 4 (Four) Hours As Needed (Pain)., Disp: 10 tablet, Rfl: 0  •  norgestrel-ethinyl estradiol (Cryselle-28) 0.3-30 MG-MCG per tablet, Cryselle (28) 0.3-30 mg-mcg oral tablet take 1 tablet by oral route once daily   Active, Disp: , Rfl:        Physical Exam  She appears well.  Her incisions look good.  Her abdomen is soft.  Objective     Vital Signs:   Resp 16   Ht 170.2 cm (67.01\")   Wt 71.2 kg (157 lb)   BMI 24.58 kg/m²              Assessment and Plan    Diagnoses and all orders for this visit:    1. Biliary dyskinesia (Primary)    I will see her on an as-needed basis.      "

## 2022-10-17 ENCOUNTER — TELEPHONE (OUTPATIENT)
Dept: SURGERY | Facility: CLINIC | Age: 42
End: 2022-10-17

## 2022-10-17 RX ORDER — SULFAMETHOXAZOLE AND TRIMETHOPRIM 800; 160 MG/1; MG/1
1 TABLET ORAL 2 TIMES DAILY
Qty: 14 TABLET | Refills: 0 | Status: SHIPPED | OUTPATIENT
Start: 2022-10-17 | End: 2022-10-24

## 2022-10-17 NOTE — TELEPHONE ENCOUNTER
Per Dr. Mccormack:  I have prescribed Rufina on oral antibiotic.  Please see if she can come in and see me either tomorrow or Friday so I can see the wound.     I called the patient and told her that Dr. Mccormack prescribed an oral antibiotic, and I scheduled an appointment for her on 10/21/22 at 9:30 AM.

## 2022-10-17 NOTE — TELEPHONE ENCOUNTER
Laparoscopic cholecystectomy 09/22/22.  Saturday, it looked like a little pocked of infection above her belly button.  Sunday it popped.  Warm to touch, and redness, which is getting bigger.  Watery drainage came out the first time, and then a little brownish-creamy drainage like puss came out the next time.  She has cleaned it with peroxide, applied antibiotic cream, and covered with a Band-Aid.  She isn't sure what to do.  Not on antibiotic.    She is at work and the number there is [phone].  If she doesn't answer, may leave message on her cell at [phone].

## 2022-10-17 NOTE — TELEPHONE ENCOUNTER
Caller: CHUY FU [birth year]    Relationship to patient: SELF     Best call back number : [phone] work #     Patient is needing:  P[T STATES HER INCISION IS INFECTED AND DRAINING . ASKING TO SPEAK WITH MEDICAL STAFF, [facility] CALLED OFFICE , WAS UNABLE TO GET MEDICAL STAFF ON THE PHONE. PT STATES SHE IS AT WORK , TO PLEASE CALL HER BACK IN REGARD TO THIS ISSUE . THANKS

## 2022-10-25 ENCOUNTER — OFFICE VISIT (OUTPATIENT)
Dept: SURGERY | Facility: CLINIC | Age: 42
End: 2022-10-25

## 2022-10-25 VITALS — RESPIRATION RATE: 14 BRPM | HEIGHT: 67 IN | WEIGHT: 161 LBS | BODY MASS INDEX: 25.27 KG/M2

## 2022-10-25 DIAGNOSIS — K82.8 BILIARY DYSKINESIA: Primary | ICD-10-CM

## 2022-10-25 PROCEDURE — 99024 POSTOP FOLLOW-UP VISIT: CPT | Performed by: SURGERY

## 2022-10-25 NOTE — PROGRESS NOTES
"Chief Complaint  Cholelithiasis and Post-op    Subjective          Rufina Smith presents to [facility] GENERAL SURGERY  History of Present Illness    Rufina Smith is a 42 y.o. female  who presents today for a postoperative visit.     Patient is here for a follow-up after a laparoscopic cholecystectomy done for biliary dyskinesia.  She is doing fine and had no complaints today.    Past History:  Medical History: has a past medical history of Biliary dyskinesia, Cholelithiasis, Kidney stone, MVP (mitral valve prolapse), Pituitary tumor, and PONV (postoperative nausea and vomiting).   Surgical History: has a past surgical history that includes d & c hysteroscopy; Kidney stone surgery; and Cholecystectomy (N/A, 9/22/2022).   Family History: family history is not on file.   Social History: reports that she has never smoked. She has never used smokeless tobacco.  Allergies: Latex       Current Outpatient Medications:   •  Blisovi FE 1/20 1-20 MG-MCG per tablet, TAKE 1 TABLET BY MOUTH EVERY DAY, Disp: 84 tablet, Rfl: 0  •  cabergoline (DOSTINEX) 0.5 MG tablet, cabergoline 0.5 mg oral tablet take 2 tablets (1 mg) by oral route twice weekly   Active, Disp: , Rfl:   •  HYDROcodone-acetaminophen (NORCO) 5-325 MG per tablet, Take 1-2 tablets by mouth Every 4 (Four) Hours As Needed (Pain)., Disp: 10 tablet, Rfl: 0  •  norgestrel-ethinyl estradiol (Cryselle-28) 0.3-30 MG-MCG per tablet, Cryselle (28) 0.3-30 mg-mcg oral tablet take 1 tablet by oral route once daily   Active, Disp: , Rfl:        Physical Exam  Her incisions look good now.  Her abdomen is soft.  Objective     Vital Signs:   Resp 14   Ht 170.2 cm (67.01\")   Wt 73 kg (161 lb)   BMI 25.21 kg/m²              Assessment and Plan    Diagnoses and all orders for this visit:    1. Biliary dyskinesia (Primary)    I will see her back on an as-needed basis.  Have asked her to call me should she have any further problems.      "

## 2022-12-27 NOTE — PROGRESS NOTES
"Well Woman Visit    CC: Scheduled annual well gyn visit  Chief Complaint   Patient presents with   • Annual Exam and discuss periods       Myriad intake in the past?: yes  Previously Qualified? YES    HPI:   42 y.o.   Social History     Substance and Sexual Activity   Sexual Activity Yes   • Partners: Male   • Birth control/protection: Birth control pill     Last Pap smear 2021 negative and negative HPV-next due     Menses:   q 2-6weeks, lasts 7-14 days, changes products q 1-2hrs on heaviest days.   Pain with menses:  Mild, OTC meds control discomfort, bilat R>L  Known prolactinoma, fb endo, due for fu labs.  OCPs not working to control menses.  Mirena IUD did not work.  No extra facial hair.     PCP: does manage PMHx and preventative labs  History: PMHx, Meds, Allergies, PSHx, Social Hx, and POBHx all reviewed and updated.      PHYSICAL EXAM:  /65   Pulse 74   Ht 170.2 cm (67\")   Wt 73.9 kg (163 lb)   LMP 2022 (Approximate)   Breastfeeding No   BMI 25.53 kg/m²  Not found.  General- NAD, alert and oriented, appropriate  Psych- Normal mood, good memory  Neck- No masses, no thyroid enlargement  CV- Regular rhythm, no murnurs  Resp- CTA to bases, no wheezes  Abdomen- Soft, non distended, non tender, no masses    Breast left-  Bilaterally symmetrical, no masses, non tender, no nipple discharge  Breast right- Bilaterally symmetrical, no masses, non tender, no nipple discharge    External genitalia- Normal female, lesions cw known psoriasis, previously biopsied, f/b derm    Physical Exam  Genitourinary:         Comments: Pale pink discolored cw psoriasis, previously biopsied, no change per pt, they do resolve w steroid ointment by derm        Urethra/meatus- Normal, no masses, non tender  Bladder- Normal, no masses, non tender  Vagina- Normal, no atrophy, no lesions, no discharge.  Prolapse: none  Cvx- Normal, no lesions, no discharge, No cervical motion tenderness  Uterus- Normal size, " shape & consistency.  Non tender, mobile, & no prolapse  Adnexa- No mass, non tender  Anus/Rectum/Perineum- Normal appearance, no mass, good sphincter tone, WITH small hemorrhoids, no prolapse    Lymphatic- No palpable neck, axillary, or groin nodes  Ext- No edema, no cyanosis    Skin- No lesions, no rashes, no acanthosis nigricans      ASSESSMENT and PLAN:    Diagnoses and all orders for this visit:    1. Well woman exam (Primary)  -     Mammo Screening Digital Tomosynthesis Bilateral With CAD; Future    2. Irregular menses  -     CBC (No Diff)  -     Prolactin  -     T4, Free  -     TSH  -     US Pelvis Transvaginal Non OB; Future    3. Dysmenorrhea  -     US Pelvis Transvaginal Non OB; Future    4. History of migraine with aura  Comments:  I do not recommend estrogen-containing contraception    5. Prolactinoma (HCC)  -     Prolactin    6. Family history of breast cancer  -     [facility] Hereditary Cancer Screen      Discussed tx options changing to a progestin only pill, versus considering tubal ligation with diagnostic laparoscopy and D&C hysteroscopy ablation, versus hysterectomy.  I recommend doing lab work and ultrasound and follow-up after to discuss further options.  Differential diagnosis reviewed briefly.    Preventative:  • BREAST HEALTH- Monthly self breast exam importance and how to reviewed. MMG and/or MRI (prn) reviewed per society guidelines and her individual history. Screen: Updated today  • CERVICAL CANCER Screening- Reviewed current ASCCP guidelines for screening w and wo cotest HPV, age specific.  Screen: Already up to date  • COLON CANCER Screening- Reviewed current medical society guidelines and options.  Screen:  Not medically needed  • Importance of WEIGHT MANAGEMENT, nutrition, and exercise reviewed  • BONE HEALTH- Reviewed current medical society guidelines and options for testing, calcium and vit D intake.  Weight bearing exercise.  DEXA: Not medically needed  • VACCINATIONS  Recommended: COVID and booster PRN, Flu annually, Tdap [id number].  Importance discussed, risk being unvaccinated reviewed.  Questions answered  • Smoking status- NON SMOKER  • Follow up PCP/Specialist PMHx and Labs     MYRIAD: Qualifies for testing. Counseled and evaluated for genetic testing. Testing accepted.      She understands the importance of having any ordered tests to be performed in a timely fashion.  The risks of not performing them include, but are not limited to, advanced cancer stages, bone loss from osteoporosis and/or subsequent increase in morbidity and/or mortality.  She is encouraged to review her results online and/or contact or office if she has questions.     Follow Up:  Return in about 4 weeks (around 1/26/2023) for FU US possible EMBx.            Jodi Canchola, DO  12/29/2022    [facility]  [facility] [place] [address]  Dept: [phone]  Dept Fax: [phone]  Loc: [phone]  Loc Fax: [phone]

## 2022-12-29 ENCOUNTER — OFFICE VISIT (OUTPATIENT)
Dept: OBSTETRICS AND GYNECOLOGY | Facility: CLINIC | Age: 42
End: 2022-12-29

## 2022-12-29 VITALS
HEIGHT: 67 IN | WEIGHT: 163 LBS | BODY MASS INDEX: 25.58 KG/M2 | HEART RATE: 74 BPM | SYSTOLIC BLOOD PRESSURE: 101 MMHG | DIASTOLIC BLOOD PRESSURE: 65 MMHG

## 2022-12-29 DIAGNOSIS — Z80.3 FAMILY HISTORY OF BREAST CANCER: ICD-10-CM

## 2022-12-29 DIAGNOSIS — N92.6 IRREGULAR MENSES: ICD-10-CM

## 2022-12-29 DIAGNOSIS — N94.6 DYSMENORRHEA: ICD-10-CM

## 2022-12-29 DIAGNOSIS — Z01.419 WELL WOMAN EXAM: Primary | ICD-10-CM

## 2022-12-29 DIAGNOSIS — G43.109 MIGRAINE WITH AURA AND WITHOUT STATUS MIGRAINOSUS, NOT INTRACTABLE: ICD-10-CM

## 2022-12-29 DIAGNOSIS — D35.2 PROLACTINOMA: ICD-10-CM

## 2022-12-29 PROBLEM — G43.909 MIGRAINE: Status: ACTIVE | Noted: 2022-12-29

## 2022-12-29 LAB
DEPRECATED RDW RBC AUTO: 40.5 FL (ref 37–54)
ERYTHROCYTE [DISTWIDTH] IN BLOOD BY AUTOMATED COUNT: 11.6 % (ref 12.3–15.4)
HCT VFR BLD AUTO: 43.2 % (ref 34–46.6)
HGB BLD-MCNC: 13.8 G/DL (ref 12–15.9)
MCH RBC QN AUTO: 30.3 PG (ref 26.6–33)
MCHC RBC AUTO-ENTMCNC: 31.9 G/DL (ref 31.5–35.7)
MCV RBC AUTO: 94.9 FL (ref 79–97)
PLATELET # BLD AUTO: 273 10*3/MM3 (ref 140–450)
PMV BLD AUTO: 11.5 FL (ref 6–12)
PROLACTIN SERPL-MCNC: 55.5 NG/ML (ref 4.79–23.3)
RBC # BLD AUTO: 4.55 10*6/MM3 (ref 3.77–5.28)
T4 FREE SERPL-MCNC: 1.27 NG/DL (ref 0.93–1.7)
TSH SERPL DL<=0.05 MIU/L-ACNC: 1.18 UIU/ML (ref 0.27–4.2)
WBC NRBC COR # BLD: 5.21 10*3/MM3 (ref 3.4–10.8)

## 2022-12-29 PROCEDURE — 84443 ASSAY THYROID STIM HORMONE: CPT | Performed by: OBSTETRICS & GYNECOLOGY

## 2022-12-29 PROCEDURE — 84146 ASSAY OF PROLACTIN: CPT | Performed by: OBSTETRICS & GYNECOLOGY

## 2022-12-29 PROCEDURE — 99396 PREV VISIT EST AGE 40-64: CPT | Performed by: OBSTETRICS & GYNECOLOGY

## 2022-12-29 PROCEDURE — 84439 ASSAY OF FREE THYROXINE: CPT | Performed by: OBSTETRICS & GYNECOLOGY

## 2022-12-29 PROCEDURE — 99214 OFFICE O/P EST MOD 30 MIN: CPT | Performed by: OBSTETRICS & GYNECOLOGY

## 2022-12-29 PROCEDURE — 85027 COMPLETE CBC AUTOMATED: CPT | Performed by: OBSTETRICS & GYNECOLOGY

## 2022-12-29 RX ORDER — NORETHINDRONE ACETATE AND ETHINYL ESTRADIOL 1MG-20(21)
1 KIT ORAL DAILY
Qty: 84 TABLET | Refills: 0 | Status: CANCELLED | OUTPATIENT
Start: 2022-12-29

## 2022-12-30 ENCOUNTER — TELEPHONE (OUTPATIENT)
Dept: OBSTETRICS AND GYNECOLOGY | Facility: CLINIC | Age: 42
End: 2022-12-30
Payer: COMMERCIAL

## 2022-12-30 NOTE — TELEPHONE ENCOUNTER
----- Message from Jodi Canchola DO sent at 12/30/2022  9:31 AM EST -----  Prolactin is still elevated at 55.  Please be sure her endocrinologist gets a copy of this, and he is following and treating.  This definitely could be a reason for her irregular menstrual cycles.  Have her keep her office visit with me after her ultrasound as should be already scheduled.  Thank you

## 2023-01-03 RX ORDER — NORETHINDRONE ACETATE AND ETHINYL ESTRADIOL 1MG-20(21)
KIT ORAL
Qty: 84 TABLET | Refills: 0 | OUTPATIENT
Start: 2023-01-03

## 2023-01-03 RX ORDER — NORETHINDRONE ACETATE AND ETHINYL ESTRADIOL 1MG-20(21)
1 KIT ORAL DAILY
Qty: 84 TABLET | Refills: 3 | OUTPATIENT
Start: 2023-01-03

## 2023-01-03 RX ORDER — ACETAMINOPHEN AND CODEINE PHOSPHATE 120; 12 MG/5ML; MG/5ML
1 SOLUTION ORAL DAILY
Qty: 90 TABLET | Refills: 4 | Status: SHIPPED | OUTPATIENT
Start: 2023-01-03 | End: 2023-03-21

## 2023-01-03 NOTE — TELEPHONE ENCOUNTER
----- Message from Rufina Smith sent at 1/3/2023  9:07 AM EST -----  Regarding: Birth control   Contact: [phone]  I am out of birth control and when I was there for my yearly appointment last Thursday you were going to send in a prescription for more but my pharmacy doesn’t have anything. Is there anyway I can get a refill on my birth control please?     Thank you    appt at 2:00

## 2023-01-03 NOTE — TELEPHONE ENCOUNTER
Patient sent message thru my chart.  I have attached her note.  Patient was seen 12-29-22 No Rx for birth control was given.  Patient was previously on Blisovi  I have attached an order.

## 2023-01-09 ENCOUNTER — TELEPHONE (OUTPATIENT)
Dept: OBSTETRICS AND GYNECOLOGY | Facility: CLINIC | Age: 43
End: 2023-01-09
Payer: COMMERCIAL

## 2023-01-09 NOTE — TELEPHONE ENCOUNTER
Left a message for the patient to advise her of her lab results and that she needs to schedule an appointment after her ultrasound to follow up.

## 2023-01-10 ENCOUNTER — LAB (OUTPATIENT)
Dept: OBSTETRICS AND GYNECOLOGY | Facility: CLINIC | Age: 43
End: 2023-01-10
Payer: COMMERCIAL

## 2023-01-16 NOTE — TELEPHONE ENCOUNTER
Left a message for the patient to advise her of her lab results and that she needs to schedule an appointment after her ultrasound to follow up.  Letter sent to the patient to contact the office to discuss her results.

## 2023-01-20 LAB — REF LAB TEST METHOD: NORMAL

## 2023-01-22 PROBLEM — Z80.3 FAMILY HISTORY OF BREAST CANCER: Status: ACTIVE | Noted: 2023-01-22

## 2023-02-09 ENCOUNTER — TELEPHONE (OUTPATIENT)
Dept: OBSTETRICS AND GYNECOLOGY | Facility: CLINIC | Age: 43
End: 2023-02-09
Payer: COMMERCIAL

## 2023-02-17 NOTE — TELEPHONE ENCOUNTER
Left a message for the patient to discuss scheduling her overdue pelvic ultrasound. Letter sent to the patient to contact the office.

## 2023-03-20 NOTE — PROGRESS NOTES
[facility] Genetic Counseling FU      Per [facility] required visit statement:  Patient presents during the COVID-19 pandemic/federally declared state of public health emergency.   This service was conducted via telephone.  I was at [facility] location and patient was not on site.  The patient has chosen to receive care through a telephone visit and has been given the option to be seen in person.    Do you consent to use a telephone visit for your medical care today? Yes    CC: Follow up genetic testing  Chief Complaint   Patient presents with   • Follow-up     [facility]     [facility] Hereditary Cancer Screen (01/10/2023 09:14)   Progress Notes by Jodi Canchola DO (12/29/2022 10:15)       HPI: Myriad: Negative     Patient has a history of menometrorrhagia and was unable to make her follow-up ultrasound or endometrial biopsy.  She questions today if it is truly needed.    PHYSICAL EXAM:  LMP 03/07/2023 (Approximate)   General- NAD (audibly if telephone OV), alert and oriented, appropriate  Psych- Normal mood, good memory    ASSESSMENT and PLAN:    Diagnoses and all orders for this visit:    1. Family history of breast cancer (Primary)  Overview:  January 2023 myriad negative  Breast cancer risk assessment lifetime 13.3%, TC 19.6%  5-year breast cancer risk 1.1%      2. Menometrorrhagia  Overview:  Prolactinoma with hyperprolactinemia-followed by endocrinology  December 2022-elevated prolactin 55, normal TFTs, normal CBC    Assessment & Plan:  We discussed today that frequent, prolonged, or heavy menses can be a sign of uterine premalignancy or malignancy.  I strongly recommend rescheduling the pelvic ultrasound and a follow-up with me afterwards for endometrial biopsy and to discuss treatment options.  My last note was reviewed.  Her questions were answered.  She agrees to follow-up and will call our office to reschedule.      Counseling: Refer to myriad report.  Results and any recommendations for FU and/or early  detection and/or prevention reviewed.  If VUS present reviewed importance of keeping active POC if status changes.        Follow Up:  Return in about 1 month (around 4/21/2023) for FU after US for EMBx.      I spent 10 minutes caring for Rufina on this date of service. This time includes time spent by me in the following activities:preparing for the visit, reviewing tests, obtaining and/or reviewing a separately obtained history, counseling and educating the patient/family/caregiver and documenting information in the medical record      Jodi Canchola DO  03/21/2023    [facility] [place] [facility]  [address] [place] [address]  Dept: [phone]  Dept Fax: [phone]  Loc: [phone]  Loc Fax: [phone]

## 2023-03-21 ENCOUNTER — OFFICE VISIT (OUTPATIENT)
Dept: OBSTETRICS AND GYNECOLOGY | Facility: CLINIC | Age: 43
End: 2023-03-21
Payer: COMMERCIAL

## 2023-03-21 ENCOUNTER — TELEPHONE (OUTPATIENT)
Dept: OBSTETRICS AND GYNECOLOGY | Facility: CLINIC | Age: 43
End: 2023-03-21
Payer: COMMERCIAL

## 2023-03-21 DIAGNOSIS — N92.1 MENOMETRORRHAGIA: ICD-10-CM

## 2023-03-21 DIAGNOSIS — Z80.3 FAMILY HISTORY OF BREAST CANCER: Primary | ICD-10-CM

## 2023-03-22 PROBLEM — N92.1 MENOMETRORRHAGIA: Status: ACTIVE | Noted: 2022-12-29

## 2023-03-22 NOTE — ASSESSMENT & PLAN NOTE
We discussed today that frequent, prolonged, or heavy menses can be a sign of uterine premalignancy or malignancy.  I strongly recommend rescheduling the pelvic ultrasound and a follow-up with me afterwards for endometrial biopsy and to discuss treatment options.  My last note was reviewed.  Her questions were answered.  She agrees to follow-up and will call our office to reschedule.

## 2023-04-18 ENCOUNTER — HOSPITAL ENCOUNTER (OUTPATIENT)
Dept: ULTRASOUND IMAGING | Facility: HOSPITAL | Age: 43
Discharge: HOME | End: 2023-04-18
Admitting: OBSTETRICS & GYNECOLOGY
Payer: COMMERCIAL

## 2023-04-18 DIAGNOSIS — N92.6 IRREGULAR MENSES: ICD-10-CM

## 2023-04-18 DIAGNOSIS — N94.6 DYSMENORRHEA: ICD-10-CM

## 2023-04-18 PROCEDURE — 76830 TRANSVAGINAL US NON-OB: CPT

## 2023-05-22 PROBLEM — R00.2 PALPITATIONS: Status: ACTIVE | Noted: 2023-05-22

## 2023-05-22 NOTE — PROGRESS NOTES
Chief Complaint  Palpitations and New Patient      History of Present Illness  Rufina Smith presents to [facility]  Patient is a 43-year-old female with a previous history of mitral valve prolapse and pituitary disorder requiring treatment with cabergoline who has had some heart palpitations symptoms with an associated symptoms slowing down of her heart rate especially at nighttime when laying in bed.  She also has reported a presyncopal episode that occurred a few weeks ago while sitting in which her vision like it was going to white out.  She does not report any change in her exercise capacity or increased risk of breath.  She has not had problems with increased edema in the lower extremities PND orthopnea.    Past Medical History:   Diagnosis Date    Biliary dyskinesia     Cholelithiasis     Coronary artery disease     I have a mitro prolapsed leaky valve    Kidney stone     Migraine WITHaura     Mitral valve prolapse 2023    MVP (mitral valve prolapse)     Pituitary tumor     Endocrinology manages, Dr. Aldo PARRISH (postoperative nausea and vomiting)     Psoriasis w vulvar component     followed by derm         Current Outpatient Medications:     cabergoline (DOSTINEX) 0.5 MG tablet, cabergoline 0.5 mg oral tablet take 2 tablets (1 mg) by oral route twice weekly   Active, Disp: , Rfl:     There are no discontinued medications.  Allergies   Allergen Reactions    Latex Rash        Social History     Tobacco Use    Smoking status: Former     Packs/day: 0.50     Years: 8.00     Pack years: 4.00     Types: Cigarettes     Start date: 1997     Quit date: 3/1/2005     Years since quittin.2     Passive exposure: Past    Smokeless tobacco: Never   Vaping Use    Vaping Use: Never used   Substance Use Topics    Alcohol use: Yes     Comment: A glass of wine on a rare occasion    Drug use: Never       Family History   Problem Relation Age of Onset    Breast cancer Maternal  "Grandmother     Breast cancer Maternal Grandfather     Colon cancer Other         on maternal sie    Heart attack Father     Heart disease Father         Heart attack at an early age    Hypertension Father     Malig Hyperthermia Neg Hx     Deep vein thrombosis Neg Hx     Pulmonary embolism Neg Hx     Ovarian cancer Neg Hx     Uterine cancer Neg Hx     Melanoma Neg Hx     Prostate cancer Neg Hx         Objective     /62   Pulse 66   Ht 170.2 cm (67\")   Wt 73.6 kg (162 lb 3.2 oz)   BMI 25.40 kg/m²       Physical Exam    General Appearance:   no acute distress  Alert and oriented x3  HENT:   lips not cyanotic  Atraumatic  Neck:  No jvd   supple  Respiratory:  no respiratory distress  normal breath sounds  no rales  Cardiovascular:  Regular rate and rhythm  no S3, no S4   no murmur  no rub  Extremities  No cyanosis  lower extremity edema: none    Skin:   warm, dry  No rashes    Result Review :     No results found for: PROBNP    CBC w/diff          12/29/2022    11:22   CBC w/Diff   WBC 5.21    RBC 4.55    Hemoglobin 13.8    Hematocrit 43.2    MCV 94.9    MCH 30.3    MCHC 31.9    RDW 11.6    Platelets 273       Lab Results   Component Value Date    TSH 1.180 12/29/2022      Lab Results   Component Value Date    FREET4 1.27 12/29/2022      No results found for: DDIMERQUANT  No results found for: MG   No results found for: DIGOXIN   No results found for: TROPONINT   No results found for: POCTROP(              ECG 12 Lead    Date/Time: 6/7/2023 1:57 PM  Performed by: Aniceto Morales MD  Authorized by: Aniceto Morales MD   Comparison: not compared with previous ECG   Previous ECG: no previous ECG available  Rhythm: sinus rhythm  Comments: Possible left atrial lodgment       No results found for this or any previous visit.             The ASCVD Risk score (Marley WOODRUFF, et al., 2019) failed to calculate for the following reasons:    Cannot find a previous HDL lab    Cannot find a previous total cholesterol lab "     Diagnoses and all orders for this visit:    1. Palpitations (Primary)  Assessment & Plan:  Patient with heart palpitations and intermittent slowing of her heart rate especially at nighttime when laying down for bed no associated syncope but does have some presyncopal episodes.  Recommended a 48-hour Holter monitor as well as an echocardiogram for further assessment if these are within normal limits encourage patient to get a Apple Watch or other device to monitor home ECGs to see if any bradycardic issues.    Orders:  -     Adult Transthoracic Echo Complete W/ Cont if Necessary Per Protocol; Future  -     Holter Monitor - 48 Hour; Future    2. Mitral valve prolapse  Assessment & Plan:  Previously described per patient but no significant regurgitation seen on last echocardiogram 4 years ago since patient has been on medication for her pituitary which can cause fibrosis of the valves recommend repeating echocardiogram to see if any functional change regurgitation now.  She does not appear to have any significant murmur on exam today              Follow Up     No follow-ups on file.          Patient was given instructions and counseling regarding her condition or for health maintenance advice. Please see specific information pulled into the AVS if appropriate.

## 2023-06-07 ENCOUNTER — OFFICE VISIT (OUTPATIENT)
Dept: CARDIOLOGY | Facility: CLINIC | Age: 43
End: 2023-06-07
Payer: COMMERCIAL

## 2023-06-07 VITALS
SYSTOLIC BLOOD PRESSURE: 101 MMHG | BODY MASS INDEX: 25.46 KG/M2 | DIASTOLIC BLOOD PRESSURE: 62 MMHG | HEART RATE: 66 BPM | WEIGHT: 162.2 LBS | HEIGHT: 67 IN

## 2023-06-07 DIAGNOSIS — I34.1 MITRAL VALVE PROLAPSE: ICD-10-CM

## 2023-06-07 DIAGNOSIS — R00.2 PALPITATIONS: Primary | ICD-10-CM

## 2023-06-07 PROCEDURE — 93000 ELECTROCARDIOGRAM COMPLETE: CPT | Performed by: INTERNAL MEDICINE

## 2023-06-07 PROCEDURE — 99204 OFFICE O/P NEW MOD 45 MIN: CPT | Performed by: INTERNAL MEDICINE

## 2023-06-07 NOTE — ASSESSMENT & PLAN NOTE
Patient with heart palpitations and intermittent slowing of her heart rate especially at nighttime when laying down for bed no associated syncope but does have some presyncopal episodes.  Recommended a 48-hour Holter monitor as well as an echocardiogram for further assessment if these are within normal limits encourage patient to get a Apple Watch or other device to monitor home ECGs to see if any bradycardic issues.

## 2023-06-07 NOTE — ASSESSMENT & PLAN NOTE
Previously described per patient but no significant regurgitation seen on last echocardiogram 4 years ago since patient has been on medication for her pituitary which can cause fibrosis of the valves recommend repeating echocardiogram to see if any functional change regurgitation now.  She does not appear to have any significant murmur on exam today

## 2023-08-16 ENCOUNTER — TELEPHONE (OUTPATIENT)
Dept: OBSTETRICS AND GYNECOLOGY | Facility: CLINIC | Age: 43
End: 2023-08-16
Payer: COMMERCIAL

## 2023-08-16 NOTE — TELEPHONE ENCOUNTER
Left message on patients voicemail to reschedule biopsy. Pt was scheduled on 08/10 but had to cancel due to covid.

## 2023-10-24 ENCOUNTER — TRANSCRIBE ORDERS (OUTPATIENT)
Dept: ADMINISTRATIVE | Facility: HOSPITAL | Age: 43
End: 2023-10-24
Payer: COMMERCIAL

## 2023-10-24 DIAGNOSIS — Z12.31 VISIT FOR SCREENING MAMMOGRAM: Primary | ICD-10-CM

## 2023-10-27 ENCOUNTER — HOSPITAL ENCOUNTER (EMERGENCY)
Facility: HOSPITAL | Age: 43
Discharge: HOME | End: 2023-10-27
Attending: EMERGENCY MEDICINE
Payer: COMMERCIAL

## 2023-10-27 ENCOUNTER — APPOINTMENT (OUTPATIENT)
Dept: CT IMAGING | Facility: HOSPITAL | Age: 43
End: 2023-10-27
Payer: COMMERCIAL

## 2023-10-27 VITALS
RESPIRATION RATE: 20 BRPM | SYSTOLIC BLOOD PRESSURE: 120 MMHG | BODY MASS INDEX: 25.33 KG/M2 | OXYGEN SATURATION: 97 % | TEMPERATURE: 98.7 F | HEART RATE: 82 BPM | WEIGHT: 161.38 LBS | HEIGHT: 67 IN | DIASTOLIC BLOOD PRESSURE: 62 MMHG

## 2023-10-27 DIAGNOSIS — S30.1XXA CONTUSION OF ABDOMINAL WALL, INITIAL ENCOUNTER: ICD-10-CM

## 2023-10-27 DIAGNOSIS — S20.219A CONTUSION OF CHEST WALL, UNSPECIFIED LATERALITY, INITIAL ENCOUNTER: ICD-10-CM
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
## 2023-10-27 DIAGNOSIS — S16.1XXA STRAIN OF NECK MUSCLE, INITIAL ENCOUNTER: ICD-10-CM

## 2023-10-27 LAB
ALBUMIN SERPL-MCNC: 4.5 G/DL (ref 3.5–5.2)
ALBUMIN/GLOB SERPL: 1.3 G/DL
ALP SERPL-CCNC: 106 U/L (ref 39–117)
ALT SERPL W P-5'-P-CCNC: 22 U/L (ref 1–33)
ANION GAP SERPL CALCULATED.3IONS-SCNC: 9.7 MMOL/L (ref 5–15)
AST SERPL-CCNC: 20 U/L (ref 1–32)
BACTERIA UR QL AUTO: ABNORMAL /HPF
BASOPHILS # BLD AUTO: 0.08 10*3/MM3 (ref 0–0.2)
BASOPHILS NFR BLD AUTO: 0.6 % (ref 0–1.5)
BILIRUB SERPL-MCNC: <0.2 MG/DL (ref 0–1.2)
BILIRUB UR QL STRIP: NEGATIVE
BUN SERPL-MCNC: 20 MG/DL (ref 6–20)
BUN/CREAT SERPL: 23 (ref 7–25)
CALCIUM SPEC-SCNC: 9.5 MG/DL (ref 8.6–10.5)
CHLORIDE SERPL-SCNC: 104 MMOL/L (ref 98–107)
CLARITY UR: ABNORMAL
CO2 SERPL-SCNC: 24.3 MMOL/L (ref 22–29)
COD CRY URNS QL: ABNORMAL /HPF
COLOR UR: YELLOW
CREAT SERPL-MCNC: 0.87 MG/DL (ref 0.57–1)
DEPRECATED RDW RBC AUTO: 38.7 FL (ref 37–54)
EGFRCR SERPLBLD CKD-EPI 2021: 85.4 ML/MIN/1.73
EOSINOPHIL # BLD AUTO: 0.43 10*3/MM3 (ref 0–0.4)
EOSINOPHIL NFR BLD AUTO: 3 % (ref 0.3–6.2)
ERYTHROCYTE [DISTWIDTH] IN BLOOD BY AUTOMATED COUNT: 11.7 % (ref 12.3–15.4)
GLOBULIN UR ELPH-MCNC: 3.4 GM/DL
GLUCOSE SERPL-MCNC: 97 MG/DL (ref 65–99)
GLUCOSE UR STRIP-MCNC: NEGATIVE MG/DL
HCT VFR BLD AUTO: 40.4 % (ref 34–46.6)
HGB BLD-MCNC: 14.2 G/DL (ref 12–15.9)
HGB UR QL STRIP.AUTO: NEGATIVE
HOLD SPECIMEN: NORMAL
HOLD SPECIMEN: NORMAL
HYALINE CASTS UR QL AUTO: ABNORMAL /LPF
IMM GRANULOCYTES # BLD AUTO: 0.05 10*3/MM3 (ref 0–0.05)
IMM GRANULOCYTES NFR BLD AUTO: 0.4 % (ref 0–0.5)
KETONES UR QL STRIP: ABNORMAL
LEUKOCYTE ESTERASE UR QL STRIP.AUTO: NEGATIVE
LYMPHOCYTES # BLD AUTO: 1.52 10*3/MM3 (ref 0.7–3.1)
LYMPHOCYTES NFR BLD AUTO: 10.7 % (ref 19.6–45.3)
MCH RBC QN AUTO: 31.7 PG (ref 26.6–33)
MCHC RBC AUTO-ENTMCNC: 35.1 G/DL (ref 31.5–35.7)
MCV RBC AUTO: 90.2 FL (ref 79–97)
MONOCYTES # BLD AUTO: 0.71 10*3/MM3 (ref 0.1–0.9)
MONOCYTES NFR BLD AUTO: 5 % (ref 5–12)
NEUTROPHILS NFR BLD AUTO: 11.43 10*3/MM3 (ref 1.7–7)
NEUTROPHILS NFR BLD AUTO: 80.3 % (ref 42.7–76)
NITRITE UR QL STRIP: NEGATIVE
NRBC BLD AUTO-RTO: 0 /100 WBC (ref 0–0.2)
PH UR STRIP.AUTO: 5.5 [PH] (ref 5–8)
PLATELET # BLD AUTO: 348 10*3/MM3 (ref 140–450)
PMV BLD AUTO: 10.4 FL (ref 6–12)
POTASSIUM SERPL-SCNC: 3.9 MMOL/L (ref 3.5–5.2)
PROT SERPL-MCNC: 7.9 G/DL (ref 6–8.5)
PROT UR QL STRIP: ABNORMAL
RBC # BLD AUTO: 4.48 10*6/MM3 (ref 3.77–5.28)
RBC # UR STRIP: ABNORMAL /HPF
REF LAB TEST METHOD: ABNORMAL
SODIUM SERPL-SCNC: 138 MMOL/L (ref 136–145)
SP GR UR STRIP: >1.03 (ref 1–1.03)
SQUAMOUS #/AREA URNS HPF: ABNORMAL /HPF
UROBILINOGEN UR QL STRIP: ABNORMAL
WBC # UR STRIP: ABNORMAL /HPF
WBC NRBC COR # BLD: 14.22 10*3/MM3 (ref 3.4–10.8)
WHOLE BLOOD HOLD COAG: NORMAL
WHOLE BLOOD HOLD SPECIMEN: NORMAL

## 2023-10-27 PROCEDURE — 85025 COMPLETE CBC W/AUTO DIFF WBC: CPT | Performed by: NURSE PRACTITIONER

## 2023-10-27 PROCEDURE — 74177 CT ABD & PELVIS W/CONTRAST: CPT

## 2023-10-27 PROCEDURE — 81001 URINALYSIS AUTO W/SCOPE: CPT | Performed by: NURSE PRACTITIONER

## 2023-10-27 PROCEDURE — 71260 CT THORAX DX C+: CPT

## 2023-10-27 PROCEDURE — 70450 CT HEAD/BRAIN W/O DYE: CPT

## 2023-10-27 PROCEDURE — 99285 EMERGENCY DEPT VISIT HI MDM: CPT

## 2023-10-27 PROCEDURE — 25010000002 ORPHENADRINE CITRATE PER 60 MG: Performed by: NURSE PRACTITIONER

## 2023-10-27 PROCEDURE — 80053 COMPREHEN METABOLIC PANEL: CPT | Performed by: NURSE PRACTITIONER

## 2023-10-27 PROCEDURE — 72125 CT NECK SPINE W/O DYE: CPT

## 2023-10-27 PROCEDURE — 96374 THER/PROPH/DIAG INJ IV PUSH: CPT

## 2023-10-27 PROCEDURE — 25510000001 IOPAMIDOL PER 1 ML: Performed by: EMERGENCY MEDICINE

## 2023-10-27 RX ORDER — ORPHENADRINE CITRATE 30 MG/ML
60 INJECTION INTRAMUSCULAR; INTRAVENOUS ONCE
Status: COMPLETED | OUTPATIENT
Start: 2023-10-27 | End: 2023-10-27

## 2023-10-27 RX ORDER — SODIUM CHLORIDE 0.9 % (FLUSH) 0.9 %
10 SYRINGE (ML) INJECTION AS NEEDED
Status: DISCONTINUED | OUTPATIENT
Start: 2023-10-27 | End: 2023-10-28 | Stop reason: HOSPADM

## 2023-10-27 RX ORDER — CYCLOBENZAPRINE HCL 10 MG
10 TABLET ORAL 3 TIMES DAILY PRN
Qty: 15 TABLET | Refills: 0 | Status: SHIPPED | OUTPATIENT
Start: 2023-10-27

## 2023-10-27 RX ADMIN — ORPHENADRINE CITRATE 60 MG: 60 INJECTION INTRAMUSCULAR; INTRAVENOUS at 20:18

## 2023-10-27 RX ADMIN — IOPAMIDOL 100 ML: 755 INJECTION, SOLUTION INTRAVENOUS at 20:56
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
## 2023-12-14 ENCOUNTER — TELEPHONE (OUTPATIENT)
Dept: OBSTETRICS AND GYNECOLOGY | Facility: CLINIC | Age: 43
End: 2023-12-14
Payer: COMMERCIAL

## 2023-12-14 NOTE — TELEPHONE ENCOUNTER
Caller: Chuy Smith    Relationship: Self    Best call back number:   CALL [phone] UNTIL 4:00 YOU MUST ASK FOR CHUY    What is the best time to reach you:     BEFORE 4:00 PM    Who are you requesting to speak with (clinical staff, provider,  specific staff member):     DR ZAMORANO OR       What was the call regarding:     PT IS READY TO SCHEDULE THE OVARY BIOPSY (10/31/2023)    PLEASE ADVISE

## 2023-12-18 NOTE — TELEPHONE ENCOUNTER
Tried to call the patient at the requested number. No one answered and routed to [employer] business voicemail. No message was left.

## 2024-01-26 ENCOUNTER — HOSPITAL ENCOUNTER (OUTPATIENT)
Dept: MAMMOGRAPHY | Facility: HOSPITAL | Age: 44
Discharge: HOME | End: 2024-01-26
Admitting: OBSTETRICS & GYNECOLOGY
Payer: COMMERCIAL

## 2024-01-26 DIAGNOSIS — Z12.31 VISIT FOR SCREENING MAMMOGRAM: ICD-10-CM

## 2024-01-26 PROCEDURE — 77067 SCR MAMMO BI INCL CAD: CPT

## 2024-01-26 PROCEDURE — 77063 BREAST TOMOSYNTHESIS BI: CPT

## 2024-02-12 ENCOUNTER — TELEPHONE (OUTPATIENT)
Dept: OBSTETRICS AND GYNECOLOGY | Facility: CLINIC | Age: 44
End: 2024-02-12
Payer: COMMERCIAL

## 2024-03-19 ENCOUNTER — TELEPHONE (OUTPATIENT)
Dept: OBSTETRICS AND GYNECOLOGY | Facility: CLINIC | Age: 44
End: 2024-03-19
Payer: COMMERCIAL

## 2024-03-20 NOTE — TELEPHONE ENCOUNTER
2nd attempt:  Left Message - lvm for pt that dr. sawyer is out of the office on her appt date and we need to reschedule. If she calls back please reschedule her appt.

## 2024-05-09 PROBLEM — L02.91 ABSCESS: Status: ACTIVE | Noted: 2023-10-18

## 2024-05-09 PROBLEM — D49.7 PITUITARY TUMOR: Status: ACTIVE | Noted: 2024-05-09

## 2024-05-09 PROBLEM — R07.9 CHEST PAIN: Status: ACTIVE | Noted: 2021-10-22

## 2024-05-09 PROBLEM — D49.7 PITUITARY TUMOR: Status: RESOLVED | Noted: 2024-05-09 | Resolved: 2024-05-09

## 2024-05-09 PROBLEM — R07.9 CHEST PAIN: Status: RESOLVED | Noted: 2021-10-22 | Resolved: 2024-05-09

## 2024-05-09 PROBLEM — L03.90 CELLULITIS: Status: RESOLVED | Noted: 2023-10-18 | Resolved: 2024-05-09

## 2024-05-09 PROBLEM — N20.0 KIDNEY STONE: Status: RESOLVED | Noted: 2024-05-09 | Resolved: 2024-05-09

## 2024-05-09 PROBLEM — L02.91 ABSCESS: Status: RESOLVED | Noted: 2023-10-18 | Resolved: 2024-05-09

## 2024-05-09 PROBLEM — L03.90 CELLULITIS: Status: ACTIVE | Noted: 2023-10-18

## 2024-05-09 PROBLEM — N20.0 KIDNEY STONE: Status: ACTIVE | Noted: 2024-05-09

## 2024-06-06 NOTE — PROGRESS NOTES
"Well Woman Visit    CC: Scheduled annual well gyn visit  Chief Complaint   Patient presents with    Gynecologic Exam       HPI:   44 y.o.   Social History     Substance and Sexual Activity   Sexual Activity Yes    Partners: Male    Birth control/protection: Abstinence     Office Visit with Jodi Canchola DO (2022)  SCANNED - PAP SMEAR (2021) Pap negative, HPV negative  Mammo Screening Digital Tomosynthesis Bilateral With CAD (2024 17:04)    Known prolactinoma, previously followed by Endo.  Has stopped seeing Dr. Marcelo.  Has not had FU.  Prolactin elevated . Stopped cabergoline, worried about SE.     Last annual 2022 with menorrhagia and dysmenorrhea, never had endometrial biopsy as ordered, periods have regulated and are now normal except more painful.    Menses:   q mo, lasts 5-7 days, changes products q 1hrs on heaviest days just to be clean, not heavy.   Pain with menses:  starts cramping a week before low back and sides, lasts for a week, midol helps, can affect ADLs.  Nl US in 2023    Pt has no complaints today.    PCP: does manage PMHx and preventative labs  History: PMHx, Meds, Allergies, PSHx, Social Hx, and POBHx all reviewed and updated.    PHYSICAL EXAM:  /75   Pulse 92   Ht 170.2 cm (67.01\")   Wt 74.8 kg (165 lb)   LMP 2024 (Exact Date)   BMI 25.84 kg/m²  Not found.   General- NAD, alert and oriented, appropriate  Psych- Normal mood, good memory  Neck- No masses, no thyroid enlargement  CV- Regular rhythm, no murmurs  Resp- CTA to bases, no wheezes  Abdomen- Soft, non distended, non tender, no masses    Chaperone present during breast and/or pelvic exam if performed.  Breast left-  Bilaterally symmetrical, no masses, non tender, no nipple discharge, no axillary or supraclavicular nodes palpable.    Breast right- Bilaterally symmetrical, no masses, non tender, no nipple discharge, no axillary or supraclavicular nodes palpable.      External " genitalia- Normal female, lesions- known psoriasis    Physical Exam  Genitourinary:         Comments: Red- psoriatic changes, mild, not raised          Urethra/meatus- Normal, no masses, non tender  Bladder- Normal, no masses, non tender  Vagina- Normal, no atrophy, no lesions, no discharge.      Cvx- Normal, no lesions, no discharge, No cervical motion tenderness  Uterus- Normal size, shape & consistency.  Non tender, mobile.  Adnexa- No mass, non tender  Anus/Rectum/Perineum- Normal appearance, no mass, good sphincter tone, WITH small hemorrhoids, no prolapse    Lymphatic- No palpable neck, axillary, or groin nodes  Ext- No edema, no cyanosis    Skin- No lesions, no rashes, no acanthosis nigricans      ASSESSMENT and PLAN:    Diagnoses and all orders for this visit:    1. Well woman exam (Primary)  -     Mammo Screening Digital Tomosynthesis Bilateral With CAD; Future    2. Birth control counseling  Comments:  declines    3. Family history of breast cancer  Overview:  January 2023 myriad negative  Breast cancer risk assessment lifetime 13.3%, TC 19.6%  5-year breast cancer risk 1.1%      4. Dysmenorrhea  Comments:  discussed ddx including endometriosis, if nsaids don't work, option of US, consider L/S  Overview:  April 2023-normal pelvic ultrasound    Orders:  -     ibuprofen (ADVIL,MOTRIN) 800 MG tablet; Start taking by mouth 2-3 days prior to starting your period.  And take on a regular basis every 8 hours.  Continue for 5 days.  Dispense: 30 tablet; Refill: 5    5. Prolactinoma  Comments:  w elevated prolactin  Orders:  -     Ambulatory Referral to Endocrinology    6. Psoriasis        Preventative:  BREAST HEALTH- Monthly self breast exam importance and how to reviewed. MMG and/or MRI (prn) reviewed per society guidelines and her individual history. Screen: Updated today  CERVICAL CANCER Screening- Reviewed current ASCCP guidelines for screening w and wo cotest HPV, age specific.  Screen: Already up to  date  COLON CANCER Screening- Reviewed current medical society guidelines and options.  Screen:  Not medically needed  Importance of WEIGHT MANAGEMENT, nutrition, and exercise reviewed.  Ideal BMI discussed  BONE HEALTH- Reviewed current medical society guidelines and options for testing, calcium and vit D intake.  Weight bearing exercise.  DEXA: Not medically needed  VACCINATIONS Recommended: Covid vaccine, Flu annually, Tdap [id number].  Importance discussed, risk being unvaccinated reviewed.  Questions answered  Smoking status- NON SMOKER/VAPER  Follow up PCP/Specialist PMHx and/or Labs    I reviewed cabergoline medication in [facility] and potential risks and side effects noted.  However I do not prescribe that medication and do not know the frequency of adverse effects and/or the risk of not taking the medicine or any alternatives.  I strongly recommend she follow-up with endocrinology to review treating or expectant management along with the risks and benefits for all of her alternatives.  Patient agrees.    Known psoriasis.  Lesions today are consistent with psoriasis on her mons.  She also states that they go away when she uses her steroid ointment.  We discussed differential diagnosis of vulvar lesions, Paget's disease or KENNEDI can have similar appearances.  If it does not resolve with steroid ointment, she needs to return for biopsy.  Patient understands potential risks of not being diagnosed and treated early, and will return to our office if it does not resolve with steroid ointment.    TRACK MENSES, RTO if <q21 days (frequent) or >q3mo (infrequent IF not on hormonal BC), >7d long, heavy, or painful.      She understands the importance of having any ordered tests to be performed in a timely fashion.  The risks of not performing them include, but are not limited to, advanced cancer stages, bone loss from osteoporosis and/or subsequent increase in morbidity and/or mortality.  She is encouraged to review her  results online and/or contact or office if she has questions.     Follow Up:  Return in about 1 year (around 6/12/2025) for WWE, sooner PRN persistence of dysmenorrhea or mons lesion.            Jodi Canchola, DO  06/12/2024    [facility] [place] [facility]  [address] [place] [address]  Dept: [phone]  Dept Fax: [phone]  Loc: [phone]  Loc Fax: [phone]

## 2024-06-12 ENCOUNTER — OFFICE VISIT (OUTPATIENT)
Dept: OBSTETRICS AND GYNECOLOGY | Facility: CLINIC | Age: 44
End: 2024-06-12
Payer: COMMERCIAL

## 2024-06-12 VITALS
WEIGHT: 165 LBS | HEIGHT: 67 IN | DIASTOLIC BLOOD PRESSURE: 75 MMHG | SYSTOLIC BLOOD PRESSURE: 111 MMHG | BODY MASS INDEX: 25.9 KG/M2 | HEART RATE: 92 BPM

## 2024-06-12 DIAGNOSIS — Z01.419 WELL WOMAN EXAM: Primary | ICD-10-CM

## 2024-06-12 DIAGNOSIS — N94.6 DYSMENORRHEA: ICD-10-CM

## 2024-06-12 DIAGNOSIS — Z30.09 BIRTH CONTROL COUNSELING: ICD-10-CM

## 2024-06-12 DIAGNOSIS — D35.2 PROLACTINOMA: ICD-10-CM

## 2024-06-12 DIAGNOSIS — Z80.3 FAMILY HISTORY OF BREAST CANCER: ICD-10-CM

## 2024-06-12 DIAGNOSIS — L40.9 PSORIASIS: ICD-10-CM

## 2024-06-12 RX ORDER — IBUPROFEN 800 MG/1
TABLET ORAL
Qty: 30 TABLET | Refills: 5 | Status: SHIPPED | OUTPATIENT
Start: 2024-06-12

## 2024-07-18 ENCOUNTER — TELEPHONE (OUTPATIENT)
Dept: OBSTETRICS AND GYNECOLOGY | Facility: CLINIC | Age: 44
End: 2024-07-18

## 2024-07-18 NOTE — TELEPHONE ENCOUNTER
Caller: Sarah Ruifna DALJIT    Relationship: Self    Best call back number: [phone]      Who are you requesting to speak with (clinical staff, DR. MACDONALDEN     Do you know the name of the person who called:     What was the call regarding: PATIENT CALLED REGARDING A BILL FOR DATE OF SERVICE  6/12/24  AN ANNUAL, IT WAS CODED WRONG,  INFORMATION REGARDING PSORIASIS AND PITUITARY GLANDS WAS INCLUDED ON THIS VISIT.  PATIENT ADVISED THAT SHE WAS ONLY ASKED ABOUT BOTH.  PLEASE ASSIST AND RESPOND IN MYCHART.

## 2024-07-22 ENCOUNTER — TELEPHONE (OUTPATIENT)
Dept: OBSTETRICS AND GYNECOLOGY | Facility: CLINIC | Age: 44
End: 2024-07-22
Payer: COMMERCIAL

## 2024-10-15 ENCOUNTER — TELEPHONE (OUTPATIENT)
Dept: OBSTETRICS AND GYNECOLOGY | Facility: CLINIC | Age: 44
End: 2024-10-15
Payer: COMMERCIAL

## 2024-10-15 NOTE — TELEPHONE ENCOUNTER
Caller: Rufina Smith    Relationship: Self    Best call back number: [phone]    What is the best time to reach you: ASAP    Who are you requesting to speak with (clinical staff, provider,  specific staff member):     DR ZAMORANO OR NURSE    What was the call regarding:     SOONEST APPT HUB COULD FIND WAS 1/15/2025    PT'S MENSES ARE GETTING WORSE AGAIN  PT NEEDS APPT TO DISCUSS HYSTO  AND WOULD NEED IT DONE THIS CALENDAR YEAR    PLEASE CALL PT TO DISCUSS

## 2024-11-18 NOTE — PROGRESS NOTES
"GYN Visit    Chief Complaint   Patient presents with    Follow-up     Discuss Hyst periods are getting heavy and painful        HPI:   44 y.o. LMP: Patient's last menstrual period was 2024.     Social History     Substance and Sexual Activity   Sexual Activity Yes    Partners: Male    Birth control/protection: None     SCANNED - PAP SMEAR (2021) pap neg and HPV neg    Progress Notes by Jodi Canchola DO (2024 13:00)    Known prolactinoma w elev prolactin previously followed by Sammie, had stopped cabergoline due to concerns with side effects, referral to another endocrinologist placed 2024 has eval scheduled next month Dr. Vida Wooten.    Seen for annual in  complaining of dysmenorrhea.  Prescribed Motrin 800 mg to start 2 to 3 days before menstrual cycle.  Ultrasound normal in 2023    For the last 3months menses heavy and worsening pain.  1 week before menses nausea and right sided pelvic pain and actual period extremely painful entire pelvis like a \"squeeze\".  Motrin makes nausea worse, only helps pain a bit.  Very heavy  Menses:   q mo, lasts 5-7 days, changes products q 1hrs on heaviest days, gushing, clots, for 3days.     History: PMHx, Meds, Allergies, PSHx, Social Hx, and POBHx all reviewed and updated.    PHYSICAL EXAM:  /79   Pulse 99   Ht 170.2 cm (67.01\")   Wt 74.8 kg (165 lb)   LMP 2024   BMI 25.83 kg/m²   Facility age limit for growth %mel is 20 years.   General- NAD, alert and oriented, appropriate  Psych- Normal mood, good memory    ASSESSMENT AND PLAN:  Diagnoses and all orders for this visit:    1. Menorrhagia with regular cycle (Primary)  Overview:  Prolactinoma with hyperprolactinemia-followed by endocrinology  2022-elevated prolactin 55, normal TFTs, normal CBC  Mirena IUD in past no change w bleeding or pain    Orders:  -     norethindrone (MICRONOR) 0.35 MG tablet; Take 1 tablet by mouth Daily.  Dispense: 90 tablet; Refill: 4  -     " CBC (No Diff)  -     TSH  -     T4, Free  -     US Non-ob Transvaginal; Future    2. Dysmenorrhea  Overview:  April 2023-normal pelvic ultrasound    Orders:  -     norethindrone (MICRONOR) 0.35 MG tablet; Take 1 tablet by mouth Daily.  Dispense: 90 tablet; Refill: 4  -     US Non-ob Transvaginal; Future    3. Family history of breast cancer  Overview:  [place]ANEL[facility] [place] Hereditary Cancer Screen (01/10/2023 09:14)   January 2023 myriad negative  Breast cancer risk assessment lifetime 13.3%, TC 19.6%  5-year breast cancer risk 1.1%      4. History of migraine with aura  Comments:  Limits use of GERRY, will avoid estrogen containing birth control    5. Prolactinoma  Comments:  w known hyperprolactinemia, not treated  Orders:  -     Prolactin    Today we reviewed the differential diagnosis of menorrhagia and dysmenorrhea.  In particular with dysmenorrhea we discussed possible endometriosis and/or adenomyosis.  With menorrhagia we discussed hormonal changes.  Her prolactin being abnormal may also be a contributing factor but since it is now regular I suspect is not contributing to the heaviness of her menstrual cycle.  Uterine abnormalities including fibroid or polyps, endometrial malignancy will reviewed.  I would recommend start lab work and ultrasound and follow-up for endometrial biopsy.    We also briefly discussed treatment options.  We will go ahead and start a progestin only pill, Mirena IUD has not helped in the past.  We discussed the option of diagnostic laparoscopy with D&C hysteroscopy possible endometrial ablation.  Patient would need sterilization at the same time.  Discussed options for treatment of endometriosis to include Orilissa or Lupron.  Discussed definitive surgical management.  I do not believe we are at definitive surgical management at this point.  Patient agrees to start norethindrone progestin only pill.    All of her questions were answered and she desires to proceed as above.    DEB risk w  hormonal BIRTH CONTROL reviewed (estrogen containing only), S/Sx to watch for discussed and questions answered.  Newer studies indicate possible increased breast cancer reviewed (both estrogen and progestin only).      Follow Up:  Return in about 4 weeks (around 12/17/2024) for FU US/Labs and EMBx.          Jodi Canchola, DO  11/19/2024    [facility]  [facility] [place] [address]  Dept: [phone]  Dept Fax: [phone]  Loc: [phone]  Loc Fax: [phone]

## 2024-11-19 ENCOUNTER — OFFICE VISIT (OUTPATIENT)
Dept: OBSTETRICS AND GYNECOLOGY | Facility: CLINIC | Age: 44
End: 2024-11-19
Payer: COMMERCIAL

## 2024-11-19 VITALS
HEIGHT: 67 IN | DIASTOLIC BLOOD PRESSURE: 79 MMHG | HEART RATE: 99 BPM | WEIGHT: 165 LBS | SYSTOLIC BLOOD PRESSURE: 121 MMHG | BODY MASS INDEX: 25.9 KG/M2

## 2024-11-19 DIAGNOSIS — N94.6 DYSMENORRHEA: ICD-10-CM

## 2024-11-19 DIAGNOSIS — Z86.69 HISTORY OF MIGRAINE WITH AURA: ICD-10-CM

## 2024-11-19 DIAGNOSIS — Z80.3 FAMILY HISTORY OF BREAST CANCER: ICD-10-CM

## 2024-11-19 DIAGNOSIS — N92.0 MENORRHAGIA WITH REGULAR CYCLE: Primary | ICD-10-CM

## 2024-11-19 DIAGNOSIS — D35.2 PROLACTINOMA: ICD-10-CM

## 2024-11-19 LAB
DEPRECATED RDW RBC AUTO: 40.8 FL (ref 37–54)
ERYTHROCYTE [DISTWIDTH] IN BLOOD BY AUTOMATED COUNT: 11.9 % (ref 12.3–15.4)
HCT VFR BLD AUTO: 40.2 % (ref 34–46.6)
HGB BLD-MCNC: 13.5 G/DL (ref 12–15.9)
MCH RBC QN AUTO: 31.6 PG (ref 26.6–33)
MCHC RBC AUTO-ENTMCNC: 33.6 G/DL (ref 31.5–35.7)
MCV RBC AUTO: 94.1 FL (ref 79–97)
PLATELET # BLD AUTO: 293 10*3/MM3 (ref 140–450)
PMV BLD AUTO: 11.2 FL (ref 6–12)
PROLACTIN SERPL-MCNC: 54.6 NG/ML (ref 4.79–23.3)
RBC # BLD AUTO: 4.27 10*6/MM3 (ref 3.77–5.28)
T4 FREE SERPL-MCNC: 1.19 NG/DL (ref 0.92–1.68)
TSH SERPL DL<=0.05 MIU/L-ACNC: 1.09 UIU/ML (ref 0.27–4.2)
WBC NRBC COR # BLD AUTO: 5.69 10*3/MM3 (ref 3.4–10.8)

## 2024-11-19 PROCEDURE — 84443 ASSAY THYROID STIM HORMONE: CPT | Performed by: OBSTETRICS & GYNECOLOGY

## 2024-11-19 PROCEDURE — 85027 COMPLETE CBC AUTOMATED: CPT | Performed by: OBSTETRICS & GYNECOLOGY

## 2024-11-19 PROCEDURE — 84439 ASSAY OF FREE THYROXINE: CPT | Performed by: OBSTETRICS & GYNECOLOGY

## 2024-11-19 PROCEDURE — 84146 ASSAY OF PROLACTIN: CPT | Performed by: OBSTETRICS & GYNECOLOGY

## 2024-11-19 RX ORDER — ACETAMINOPHEN AND CODEINE PHOSPHATE 120; 12 MG/5ML; MG/5ML
1 SOLUTION ORAL DAILY
Qty: 90 TABLET | Refills: 4 | Status: SHIPPED | OUTPATIENT
Start: 2024-11-19

## 2024-11-20 PROBLEM — E22.1 HYPERPROLACTINEMIA: Status: ACTIVE | Noted: 2024-11-20

## 2024-11-22 ENCOUNTER — TELEPHONE (OUTPATIENT)
Dept: OBSTETRICS AND GYNECOLOGY | Facility: CLINIC | Age: 44
End: 2024-11-22
Payer: COMMERCIAL

## 2024-11-22 NOTE — TELEPHONE ENCOUNTER
Name: Rufina Smith    Relationship: Self    Best Callback Number: [phone] / [facility]    HUB PROVIDED THE RELAY MESSAGE FROM THE OFFICE   PATIENT HAS FURTHER QUESTIONS AND WOULD LIKE A CALL BACK AT THE FOLLOWING PHONE NUMBER [phone]    ADDITIONAL INFORMATION: PT IS WANTING TO KNOW IF SHE STILL NEEDS TO SCHEDULE A FOLLOW UP U/S & GYN APPT WITH DR ZAMORANO IN 4 WEEKS    PLEASE CALL THE PT TO LET HER KNOW - IF SHE DOES NOT ANSWER, PLEASE LEAVE A DETAILED MESSAGE

## 2024-11-22 NOTE — TELEPHONE ENCOUNTER
----- Message from Jodi Canchola sent at 11/20/2024  7:39 AM EST -----  Prolactin is still elevated, recommend she keep her follow-up with endocrinology as scheduled next month to manage high prolactin levels and prolactinoma.

## 2024-11-22 NOTE — TELEPHONE ENCOUNTER
Attempted to contact patient, about lab results/recommendation. Left voice mail to call back HUB OK to give results.

## 2025-01-03 ENCOUNTER — TELEPHONE (OUTPATIENT)
Dept: OBSTETRICS AND GYNECOLOGY | Facility: CLINIC | Age: 45
End: 2025-01-03

## 2025-01-03 NOTE — TELEPHONE ENCOUNTER
Caller:   SUSHMA BOCANEGRA Sarah  Female, 44 y.o., 1980  MRN: [id number]  CSN: [id number]  Phone: [phone]    Relationship to patient: SELF      Chief complaint: PT WOULD LIKE TO RESCHEDULE PROCEDURE THAT IS ON 1-13-25    Type of visit: ULTRA SOUND/ IN OFFICE PROC    Requested date: A TUE, WED OR THUR     If rescheduling, when is the original appointment: 1-13-24     Additional notes:PT WOULD LIKE A CALL BACK , IF NO ANSWER LEAVE DETAILED VOICEMAIL

## 2025-01-03 NOTE — TELEPHONE ENCOUNTER
Left voicemail for patient stating that a message will be sent to reschedule her appointments. However, Dr. Canchola is not in the office on some of the days that she is requesting.

## 2025-01-07 ENCOUNTER — TELEPHONE (OUTPATIENT)
Dept: OBSTETRICS AND GYNECOLOGY | Facility: CLINIC | Age: 45
End: 2025-01-07
Payer: COMMERCIAL

## 2025-01-07 NOTE — TELEPHONE ENCOUNTER
DELETE AFTER REVIEWING: Send this encounter to the appropriate pool. See your Call Action Grid or Workflows for direction.    Caller: Rufina Smith    Relationship to patient: Self    Best call back number: [phone]    Chief complaint: PT CALLED STATED SHE WANTED TO SEE IF WE COULD R/S HER U/S AND IN OFFICE PROCEDURE THAT IS SCHEDULED FOR 01/16/25    Type of visit: U/S AND IN OFFICE PROCEDURE EM BX    Requested date: SOMETIME IN FEBRUARY; TUES, WED OR THURS IF POSSIBLE     If rescheduling, when is the original appointment: 01/16/25     Additional notes: UNABLE TO WT CALL; PLEASE CALL PT TO R/S U/S AND EM BX ON 01/16/25

## 2025-01-09 NOTE — TELEPHONE ENCOUNTER
Tried to call the patient to reschedule her appointments. She did not answer and her voicemail was full.

## 2025-01-28 ENCOUNTER — HOSPITAL ENCOUNTER (OUTPATIENT)
Dept: MAMMOGRAPHY | Facility: HOSPITAL | Age: 45
Discharge: HOME | End: 2025-01-28
Admitting: OBSTETRICS & GYNECOLOGY
Payer: COMMERCIAL

## 2025-01-28 DIAGNOSIS — Z01.419 WELL WOMAN EXAM: ICD-10-CM

## 2025-01-28 PROCEDURE — 77067 SCR MAMMO BI INCL CAD: CPT

## 2025-01-28 PROCEDURE — 77063 BREAST TOMOSYNTHESIS BI: CPT

## 2025-01-31 NOTE — PROGRESS NOTES
"GYN Visit    Chief Complaint   Patient presents with    Follow-up     Follow up us     EMBX        HPI:   45 y.o. LMP: Patient's last menstrual period was 2025.     Social History     Substance and Sexual Activity   Sexual Activity Yes    Partners: Male    Birth control/protection: None     SCANNED - PAP SMEAR (2021) pap neg and HPV neg     CBC (No Diff) (2024 11:24)  Prolactin (2024 11:24)  TSH (2024 11:24)  T4, Free (2024 11:24)    Progress Notes by Jodi Canchola DO (2024 11:00)    PMHX: Prolactinoma, lifetime risk breast cancer 19.6, Hx migraine with aura    Seen in November.  Known prolactinoma followed by Sammie.  Had stopped cabergoline due to side effects, had been off for several months.  2024 Dr. Vida davidson noted her levels were high and restarted on cabergline and FU MRI scheduled this mo.      Menses heavy changing every hour, gushing and clots for 3 days along with 1 week prior to menses nausea and right sided pelvic pain.  Nausea worse with Motrin and pain not resolved.  Labs wnl.  US today w irreg EMS (on menses) and pelvic congestion.  Last OV Rx micronor.     Rash severe w micronor, only took it a week. No change in bleeding or pain.      History: PMHx, Meds, Allergies, PSHx, Social Hx, and POBHx all reviewed and updated.    PHYSICAL EXAM:  /64   Pulse 63   Ht 170.2 cm (67.01\")   Wt 76.2 kg (168 lb)   LMP 2025   BMI 26.30 kg/m²   Facility age limit for growth %mel is 20 years.   Chaperone present during breast and/or pelvic exam if performed.  General- NAD, alert and oriented, appropriate  Psych- Normal mood, good memory      ASSESSMENT AND PLAN:  Diagnoses and all orders for this visit:    1. Menorrhagia with regular cycle (Primary)  Overview:  Prolactinoma with hyperprolactinemia-followed by endocrinology    Mirena IUD in past no change w bleeding or pain  Motrin no change  POP rash    2024, nl CBC and TFTs  " 2025 nl UT and adnexa.  EMS 10mm, irreg (on menses) ? polyps    Orders:  -     POC Pregnancy, Urine  -     Tissue Pathology Exam; Future  -     Tissue Pathology Exam    2. Dysmenorrhea  Overview:  April 2023-normal pelvic ultrasound  Jan 2025 pelvic congestion      3. Pelvic congestion    4. Hyperprolactinemia w prolactinoma  Overview:  Endo manages  Nov 2024 prolactin 55      5. Family history of breast cancer  Overview:  [place] Hereditary Cancer Screen (01/10/2023 09:14)   January 2023 myriad negative  Breast cancer risk assessment lifetime 13.3%, TC 19.6%  5-year breast cancer risk 1.1%      6. History of migraine with aura      Today we reviewed the differential diagnosis of menorrhagia not limited to polyp, fibroids, endocrine related causes, EIN, endometrial malignancy.  Ultrasound today does show irregular endometrial stripe which may be secondary to menstrual phase.  We have limited treatment options secondary to an allergy to progestin only pill and a history of the migraine with aura.  Mirena IUD did not work.  We discussed option of Depo-Provera.  We also reviewed risk benefits alternatives side effects and efficacy of further hormonal options or outpatient surgical options.    We discussed differential diagnosis of dysmenorrhea.  Might be secondary to pelvic congestion.  Clinically, might be endometriosis.  DDx include primary dysmenorrhea, adenomyosis.            Follow Up:  Return for discuss surgery/ Tx options.          Jodi Canchola,   02/04/2025    [facility]  [facility] [place] [address]  Dept: [phone]  Dept Fax: [phone]  Loc: [phone]  Loc Fax: [phone]       Endometrial Biopsy Procedure Note      CC:  Pt presents for Endometrial Bx  Chief Complaint   Patient presents with    Follow-up     Follow up us     EMBX        Social History     Substance and Sexual Activity   Sexual Activity Yes    Partners: Male    Birth  "control/protection: None     LMP: Patient's last menstrual period was 01/31/2025.     Procedure reviewed in detail.  She understands the potential risks include, but are not limited to, pain, bleeding, uterine perforation and infection.  Her questions have been answered.      Subjective/HPI:  See above    Objective:  /64   Pulse 63   Ht 170.2 cm (67.01\")   Wt 76.2 kg (168 lb)   LMP 01/31/2025   BMI 26.30 kg/m²   External genitalia- Without lesion   Vulva/Vagina/Perineum- Without lesion  Cervix- Without lesion   Paracervical block : Benzocaine 20% spray applied to cervix after verbal consent.  Tolerated well.   Uterus- Normal size, shape & consistency.  Non tender, mobile.  Betadine/Hibiclens x3.  Tenaculum placed.  Uterus sounded to 8cm.    EMBx performed without difficulty.    Tissue sent for pathology.    Patient tolerated the procedure well.  Chaperone present during pelvic exam.    Assessment and Plan:  Diagnoses and all orders for this visit:    1. Menorrhagia with regular cycle (Primary)  Overview:  Prolactinoma with hyperprolactinemia-followed by endocrinology    Mirena IUD in past no change w bleeding or pain  Motrin no change  POP rash    Nov 2024, nl CBC and TFTs  Jan 2025 nl UT and adnexa.  EMS 10mm, irreg (on menses) ? polyps    Orders:  -     POC Pregnancy, Urine  -     Tissue Pathology Exam; Future  -     Tissue Pathology Exam    2. Dysmenorrhea  Overview:  April 2023-normal pelvic ultrasound  Jan 2025 pelvic congestion      3. Pelvic congestion    4. Hyperprolactinemia w prolactinoma  Overview:  Endo manages  Nov 2024 prolactin 55      5. Family history of breast cancer  Overview:  [place] [place] Hereditary Cancer Screen (01/10/2023 09:14)   January 2023 myriad negative  Breast cancer risk assessment lifetime 13.3%, TC 19.6%  5-year breast cancer risk 1.1%      6. History of migraine with aura          Counseling:  Biopsy is ~94% sensitive, a negative biopsy is not 100% certain of no " malignancy.   PRECAUTIONS - It is common to have bright red spotting and/or bleeding.  She should not be bleeding heavily.  She can use OTC pain relievers prn.  She needs to return to office or ER (if after hours/weekends) if she has pelvic pain, bleeding > 1 pad/2 hours, discharge that has a bad vaginal odor or vaginal itching, fever > 101.5, or any other concerns.    Pt to call office if hasn't received results and recommended next steps in the next 7-10days.              Follow Up:  Return for discuss surgery/ Tx options.        Jodi Canchola DO  02/04/2025    [facility] [place] [facility]  [facility] [place] [address]  Dept: [phone]  Dept Fax: [phone]  Loc: [phone]  Loc Fax: [phone]

## 2025-02-04 ENCOUNTER — PROCEDURE VISIT (OUTPATIENT)
Dept: OBSTETRICS AND GYNECOLOGY | Facility: CLINIC | Age: 45
End: 2025-02-04
Payer: COMMERCIAL

## 2025-02-04 ENCOUNTER — PREP FOR SURGERY (OUTPATIENT)
Dept: OTHER | Facility: HOSPITAL | Age: 45
End: 2025-02-04
Payer: COMMERCIAL

## 2025-02-04 VITALS
WEIGHT: 168 LBS | HEART RATE: 63 BPM | SYSTOLIC BLOOD PRESSURE: 102 MMHG | HEIGHT: 67 IN | BODY MASS INDEX: 26.37 KG/M2 | DIASTOLIC BLOOD PRESSURE: 64 MMHG

## 2025-02-04 DIAGNOSIS — N92.0 MENORRHAGIA WITH REGULAR CYCLE: Primary | ICD-10-CM

## 2025-02-04 DIAGNOSIS — E22.1 HYPERPROLACTINEMIA: ICD-10-CM

## 2025-02-04 DIAGNOSIS — N94.6 DYSMENORRHEA: ICD-10-CM

## 2025-02-04 DIAGNOSIS — Z86.69 HISTORY OF MIGRAINE WITH AURA: ICD-10-CM

## 2025-02-04 DIAGNOSIS — N94.89 PELVIC CONGESTION: ICD-10-CM

## 2025-02-04 DIAGNOSIS — Z80.3 FAMILY HISTORY OF BREAST CANCER: ICD-10-CM

## 2025-02-04 PROBLEM — E22.9 PITUITARY MICROADENOMA WITH HYPERPROLACTINEMIA: Status: ACTIVE | Noted: 2025-01-23

## 2025-02-04 PROBLEM — D35.2 PITUITARY MICROADENOMA WITH HYPERPROLACTINEMIA: Status: ACTIVE | Noted: 2025-01-23

## 2025-02-04 PROBLEM — L40.50 PSORIATIC ARTHRITIS: Status: ACTIVE | Noted: 2024-08-21

## 2025-02-04 PROBLEM — L40.50 PSORIATIC ARTHRITIS: Status: RESOLVED | Noted: 2024-08-21 | Resolved: 2025-02-04

## 2025-02-04 PROBLEM — N93.9 ABNORMAL UTERINE BLEEDING (AUB): Status: ACTIVE | Noted: 2025-02-04

## 2025-02-04 LAB
B-HCG UR QL: NEGATIVE
EXPIRATION DATE: NORMAL
INTERNAL NEGATIVE CONTROL: NORMAL
INTERNAL POSITIVE CONTROL: NORMAL
Lab: NORMAL

## 2025-02-04 PROCEDURE — 88305 TISSUE EXAM BY PATHOLOGIST: CPT | Performed by: OBSTETRICS & GYNECOLOGY

## 2025-02-04 RX ORDER — SODIUM CHLORIDE 0.9 % (FLUSH) 0.9 %
10 SYRINGE (ML) INJECTION AS NEEDED
OUTPATIENT
Start: 2025-02-04

## 2025-02-04 RX ORDER — ONDANSETRON 2 MG/ML
4 INJECTION INTRAMUSCULAR; INTRAVENOUS EVERY 6 HOURS PRN
OUTPATIENT
Start: 2025-02-04

## 2025-02-04 RX ORDER — CABERGOLINE 0.5 MG/1
0.25 TABLET ORAL
COMMUNITY
Start: 2024-12-18

## 2025-02-04 RX ORDER — SODIUM CHLORIDE 9 MG/ML
40 INJECTION, SOLUTION INTRAVENOUS AS NEEDED
OUTPATIENT
Start: 2025-02-04 | End: 2025-02-04

## 2025-02-04 RX ORDER — SODIUM CHLORIDE 0.9 % (FLUSH) 0.9 %
3 SYRINGE (ML) INJECTION EVERY 12 HOURS SCHEDULED
OUTPATIENT
Start: 2025-02-04

## 2025-02-04 RX ORDER — SODIUM CHLORIDE, SODIUM LACTATE, POTASSIUM CHLORIDE, CALCIUM CHLORIDE 600; 310; 30; 20 MG/100ML; MG/100ML; MG/100ML; MG/100ML
150 INJECTION, SOLUTION INTRAVENOUS CONTINUOUS
OUTPATIENT
Start: 2025-02-04 | End: 2025-02-04

## 2025-02-06 ENCOUNTER — TELEPHONE (OUTPATIENT)
Dept: OBSTETRICS AND GYNECOLOGY | Facility: CLINIC | Age: 45
End: 2025-02-06
Payer: COMMERCIAL

## 2025-02-06 NOTE — TELEPHONE ENCOUNTER
----- Message from Jodi Canchola sent at 2/6/2025 12:12 PM EST -----  Endometrial biopsy was negative for precancer or cancer.  Please keep plan as per last office visit.

## 2025-02-06 NOTE — TELEPHONE ENCOUNTER
Attempted to contact patient, to go over results/ recommendations. Tried to leave voicemail but it was full.

## 2025-06-02 ENCOUNTER — TELEPHONE (OUTPATIENT)
Dept: CARDIOLOGY | Facility: CLINIC | Age: 45
End: 2025-06-02

## 2025-07-03 NOTE — PROGRESS NOTES
"GYN Visit    Chief Complaint   Patient presents with    Follow-up       HPI:   45 y.o. LMP: No LMP recorded.     Social History     Substance and Sexual Activity   Sexual Activity Yes    Partners: Male    Birth control/protection: None     Mammo Screening Digital Tomosynthesis Bilateral With CAD (2025 09:49)  SCANNED - PAP SMEAR (2021) Pap negative, HPV negative    Progress Notes by Jodi Canchola DO (2024 13:00)  Progress Notes by Jodi Canchola DO (2025 10:45)    Prolactinoma w hyperprolactinemia f/b Endo and is now back on meds, lifetime risk breast cancer 19.6, migraines with aura, rash w micronor.  Fibromyalgia     Seen in Feb for AUB with EMBx, no follow-up.  Pain and bleeding worsening.  Pain starts on right side and midline squeeze.       History: PMHx, Meds, Allergies, PSHx, Social Hx, and POBHx all reviewed and updated.    PHYSICAL EXAM:  /74   Pulse 65   Ht 170.2 cm (67.01\")   Wt 71.2 kg (157 lb)   Breastfeeding No   BMI 24.58 kg/m²   Facility age limit for growth %mel is 20 years.   Chaperone present during breast and/or pelvic exam if performed.  General- NAD, alert and oriented, appropriate  Psych- Normal mood, good memory      ASSESSMENT AND PLAN:  Diagnoses and all orders for this visit:    1. Abnormal uterine bleeding (AUB) (Primary)  Overview:  Prolactinoma with hyperprolactinemia-followed by endocrinology    Mirena IUD in past no change w bleeding or pain  Motrin no change  POP rash    2024, nl CBC and TFTs  2025 nl UT and adnexa.  EMS 10mm, irreg (on menses) ? Polyps.  EMBx benign, menstrual    Assessment & Plan:  Today we discussed with abnormal endometrium on US, despite EMBX, possibility of abnormality including EIN or malignancy.  Does need surgery with either pathologic diagnosis with D&C or hysterectomy.  We reviewed the risk benefits alternatives side effects and efficacy of diagnostic laparoscopy with bilateral salpingectomy for sterilization, " "fulguration of any endometriosis found.  Along with D&C hysteroscopy and endometrial ablation.  We did discuss if pain is secondary to adenomyosis endometrial ablation has less chance of long-term success.  Reviewed hysterectomy would be definitive surgery for bleeding, however if ovaries are left behind and endometriosis is present she may continue to have cyclic pain.  Reviewed the option of removal of 1 ovary, her right side which is her worst side versus possible removal of both ovaries.  Risk of BSO at 45 years of age reviewed including if endometriosis is found to using estrogen is limited and secondary to her allergy to progestin only pill also may limit our use of progesterone.  We also could consider Orilissa if endometriosis is found and ovaries are not removed.  Questions answered, she is considering above options and will let me know.      2. Dysmenorrhea  Overview:  April 2023-normal pelvic ultrasound  Jan 2025 pelvic congestion      3. Pituitary microadenoma with hyperprolactinemia  Overview:  Endo (Augusto Wooten MD) manages  MRI 10/09/2018 noted \"stable 3 mm area of decreased enhancement on the left\" of pituitary gland. Otherwise normal MRI.      4. History of migraine with aura            Follow Up:  Return for Discuss surgical options once decided.          Jodi Canchola DO  07/08/2025    [facility] [address] [facility]  [address] [place] [address] [address] [address]  Dept: [phone]  Dept Fax: [phone]  Loc: [phone]  Loc Fax: [phone]     "

## 2025-07-06 PROBLEM — N93.9 ABNORMAL UTERINE BLEEDING (AUB): Status: ACTIVE | Noted: 2022-12-29

## 2025-07-08 ENCOUNTER — OFFICE VISIT (OUTPATIENT)
Dept: OBSTETRICS AND GYNECOLOGY | Age: 45
End: 2025-07-08
Payer: COMMERCIAL

## 2025-07-08 VITALS
SYSTOLIC BLOOD PRESSURE: 113 MMHG | HEART RATE: 65 BPM | DIASTOLIC BLOOD PRESSURE: 74 MMHG | BODY MASS INDEX: 24.64 KG/M2 | HEIGHT: 67 IN | WEIGHT: 157 LBS

## 2025-07-08 DIAGNOSIS — D35.2 PITUITARY MICROADENOMA WITH HYPERPROLACTINEMIA: ICD-10-CM

## 2025-07-08 DIAGNOSIS — E22.9 PITUITARY MICROADENOMA WITH HYPERPROLACTINEMIA: ICD-10-CM

## 2025-07-08 DIAGNOSIS — Z86.69 HISTORY OF MIGRAINE WITH AURA: ICD-10-CM

## 2025-07-08 DIAGNOSIS — N94.6 DYSMENORRHEA: ICD-10-CM

## 2025-07-08 DIAGNOSIS — N93.9 ABNORMAL UTERINE BLEEDING (AUB): Primary | ICD-10-CM

## 2025-07-08 NOTE — ASSESSMENT & PLAN NOTE
Today we discussed with abnormal endometrium on US, despite EMBX, possibility of abnormality including EIN or malignancy.  Does need surgery with either pathologic diagnosis with D&C or hysterectomy.  We reviewed the risk benefits alternatives side effects and efficacy of diagnostic laparoscopy with bilateral salpingectomy for sterilization, fulguration of any endometriosis found.  Along with D&C hysteroscopy and endometrial ablation.  We did discuss if pain is secondary to adenomyosis endometrial ablation has less chance of long-term success.  Reviewed hysterectomy would be definitive surgery for bleeding, however if ovaries are left behind and endometriosis is present she may continue to have cyclic pain.  Reviewed the option of removal of 1 ovary, her right side which is her worst side versus possible removal of both ovaries.  Risk of BSO at 45 years of age reviewed including if endometriosis is found to using estrogen is limited and secondary to her allergy to progestin only pill also may limit our use of progesterone.  We also could consider Orilissa if endometriosis is found and ovaries are not removed.  Questions answered, she is considering above options and will let me know.

## 2025-07-14 ENCOUNTER — PREP FOR SURGERY (OUTPATIENT)
Dept: OTHER | Facility: HOSPITAL | Age: 45
End: 2025-07-14
Payer: COMMERCIAL

## 2025-07-29 ENCOUNTER — OFFICE VISIT (OUTPATIENT)
Dept: CARDIOLOGY | Facility: CLINIC | Age: 45
End: 2025-07-29
Payer: COMMERCIAL

## 2025-07-29 VITALS
WEIGHT: 151.2 LBS | HEART RATE: 80 BPM | DIASTOLIC BLOOD PRESSURE: 66 MMHG | HEIGHT: 67 IN | SYSTOLIC BLOOD PRESSURE: 99 MMHG | BODY MASS INDEX: 23.73 KG/M2

## 2025-07-29 DIAGNOSIS — R42 LIGHTHEADEDNESS: ICD-10-CM

## 2025-07-29 DIAGNOSIS — I34.1 MITRAL VALVE PROLAPSE: Primary | ICD-10-CM

## 2025-07-29 PROBLEM — R00.2 PALPITATIONS: Status: RESOLVED | Noted: 2023-05-22 | Resolved: 2025-07-29

## 2025-07-29 PROCEDURE — 99213 OFFICE O/P EST LOW 20 MIN: CPT | Performed by: NURSE PRACTITIONER

## 2025-07-29 PROCEDURE — 93000 ELECTROCARDIOGRAM COMPLETE: CPT | Performed by: NURSE PRACTITIONER

## 2025-07-29 NOTE — PROGRESS NOTES
Chief Complaint  Follow-up and Chest Pain    Subjective            History of Present Illness  Rufina Smith is a 45-year-old female patient who presents to the office today for follow up.    History of Present Illness  The patient presents for evaluation of palpitations.    She reports experiencing episodes of slow heartbeats, during which she feels lightheaded and as if her heart has stopped beating. These episodes have been occurring for approximately 3 months. She recently acquired a smartwatch with heart rate monitoring capabilities but has not yet utilized this feature. She is scheduled to undergo surgery in October 2025 for endometriosis, which will include an ablation and tubal ligation.          PMH  Past Medical History:   Diagnosis Date    Abscess 10/18/2023    Biliary dyskinesia     Cellulitis 10/18/2023    Chest pain 10/22/2021    Cholelithiasis     Coronary artery disease     I have a mitro prolapsed leaky valve    Diverticulitis of colon     My latest test showed a light case of diverticulitis    Fibromyalgia     H/O psoriatic arthritis     Kidney stone     Migraine WITHaura     Mitral valve prolapse 06/07/2023    Ovarian cyst     Pituitary tumor     Endocrinology manages, Dr. Carlson    PMS (premenstrual syndrome)     PONV (postoperative nausea and vomiting)     Psoriasis w vulvar component     followed by derm    Psoriatic arthritis 08/21/2024    Recurrent pregnancy loss, antepartum condition or complication     Urinary tract infection          ALLERGY  Allergies   Allergen Reactions    Latex Rash    Micronor [Norethindrone] Rash          SURGICALHX  Past Surgical History:   Procedure Laterality Date    CHOLECYSTECTOMY N/A 09/22/2022    Procedure: CHOLECYSTECTOMY LAPAROSCOPIC;  Surgeon: Efrain Mccormack MD;  [facility]: [facility];  Service: General;  Laterality: N/A;    D & C HYSTEROSCOPY      X2    KIDNEY STONE SURGERY            SOC  Social History     Socioeconomic History    Marital status:  "   Tobacco Use    Smoking status: Former     Current packs/day: 0.00     Average packs/day: 0.5 packs/day for 8.0 years (4.0 ttl pk-yrs)     Types: Cigarettes     Start date: 1997     Quit date: 3/1/2005     Years since quittin.4     Passive exposure: Past    Smokeless tobacco: Never   Vaping Use    Vaping status: Never Used   Substance and Sexual Activity    Alcohol use: Not Currently     Comment: A glass of wine on a rare occasion    Drug use: Never    Sexual activity: Yes     Partners: Male     Birth control/protection: None         FAMHX  Family History   Problem Relation Age of Onset    Breast cancer Maternal Grandmother     Cancer Maternal Grandmother         Breast cancer    Breast cancer Maternal Grandfather     Cancer Maternal Grandfather         Breast and lung cancer    Colon cancer Other         on maternal sie    Heart attack Father     Heart disease Father         Heart attack at an early age    Hypertension Father     Arthritis Father     Diabetes Paternal Grandmother     Miscarriages / Stillbirths Paternal Grandmother     Breast cancer Paternal Uncle         Pancreatic cancer    Early death Paternal Grandfather         Brain aneurysm    Cancer Paternal Uncle         Pancreas cancer    Malig Hyperthermia Neg Hx     Deep vein thrombosis Neg Hx     Pulmonary embolism Neg Hx     Ovarian cancer Neg Hx     Uterine cancer Neg Hx     Melanoma Neg Hx     Prostate cancer Neg Hx           MEDSIGONLY  Current Outpatient Medications on File Prior to Visit   Medication Sig    cabergoline (DOSTINEX) 0.5 MG tablet Take 0.5 tablets by mouth.     No current facility-administered medications on file prior to visit.       Objective   BP 99/66   Pulse 80   Ht 170.2 cm (67.01\")   Wt 68.6 kg (151 lb 3.2 oz)   BMI 23.68 kg/m²       Physical Exam  Constitutional:       Appearance: She is normal weight.   HENT:      Head: Normocephalic.   Neck:      Vascular: No carotid bruit.   Cardiovascular:      Rate " "and Rhythm: Normal rate and regular rhythm.      Pulses: Normal pulses.      Heart sounds: Normal heart sounds. No murmur heard.  Pulmonary:      Effort: Pulmonary effort is normal.      Breath sounds: Normal breath sounds.   Musculoskeletal:      Cervical back: Neck supple.      Right lower leg: No edema.      Left lower leg: No edema.   Skin:     General: Skin is dry.   Neurological:      Mental Status: She is alert and oriented to person, place, and time.   Psychiatric:         Behavior: Behavior normal.       ECG 12 Lead    Date/Time: 7/29/2025 9:41 AM  Performed by: Shruthi Vasquez APRN    Authorized by: Shruthi Vasquez APRN  Comparison: compared with previous ECG from 6/7/2023  Similar to previous ECG  Rhythm: sinus rhythm  Rate: normal  BPM: 64  Conduction: conduction normal  ST Segments: ST segments normal  T Waves: T waves normal  QRS axis: normal  Other findings: low voltage    Clinical impression: normal ECG          Result Review :   The following data was reviewed by: MATTI Oliver on 07/29/2025:  No results found for: \"PROBNP\"    CBC w/diff          11/19/2024    11:24   CBC w/Diff   WBC 5.69    RBC 4.27    Hemoglobin 13.5    Hematocrit 40.2    MCV 94.1    MCH 31.6    MCHC 33.6    RDW 11.9    Platelets 293       Lab Results   Component Value Date    TSH 1.090 11/19/2024      Lab Results   Component Value Date    FREET4 1.19 11/19/2024      No results found for: \"DDIMERQUANT\"  No results found for: \"MG\"   No results found for: \"DIGOXIN\"   No results found for: \"TROPONINT\"        Results for orders placed in visit on 07/13/23    Adult Transthoracic Echo Complete W/ Cont if Necessary Per Protocol 07/13/2023  9:53 AM    Interpretation Summary    Left ventricular systolic function is normal. Calculated left ventricular EF = 65.7%    Left ventricular diastolic function was normal.    Estimated right ventricular systolic pressure from tricuspid regurgitation is normal (<35 mmHg).       "   Assessment and Plan    Diagnoses and all orders for this visit:    1. Mitral valve prolapse (Primary)  -     ECG 12 Lead    2. Lightheadedness  -     Holter Monitor - 72 Hour Up To 15 Days; Future  -     Cancel: Adult Stress Echo W/ Cont or Stress Agent if Necessary Per Protocol; Future  -     Basic Metabolic Panel; Future  -     Magnesium; Future  -     CBC (No Diff); Future  -     Adult Stress Echo W/ Cont or Stress Agent if Necessary Per Protocol; Future        Assessment & Plan  1. Palpitations:  - Order a stress echocardiogram to investigate potential ischemia and assess LVEF  - Conduct lab tests to check electrolyte levels, kidney function, and blood counts to rule out imbalances or anemia contributing to lightheadedness.  - Place a Holter monitor for a longer duration to obtain more accurate rhythm/rate trend.  - Educate on the use of a smartwatch to monitor heart rate and encourage tracking of symptoms.  - Advise on lifestyle modifications to reduce palpitations, including reducing caffeine and alcohol intake, managing stress, and avoiding nicotine.      Follow Up   Return for Will call with any abnormal labs.    Patient was given instructions and counseling regarding her condition or for health maintenance advice. Please see specific information pulled into the AVS if appropriate.     Rufina Smith  reports that she quit smoking about 20 years ago. Her smoking use included cigarettes. She started smoking about 28 years ago. She has a 4 pack-year smoking history. She has been exposed to tobacco smoke. She has never used smokeless tobacco.        Patient or patient representative verbalized consent for the use of Ambient Listening during the visit with  MATTI Oliver for chart documentation. 7/29/2025  10:37 EDT    MATTI Oliver  07/29/25  09:39 EDT    Dictated Utilizing Dragon Dictation

## 2025-08-06 ENCOUNTER — PREP FOR SURGERY (OUTPATIENT)
Dept: OTHER | Facility: HOSPITAL | Age: 45
End: 2025-08-06
Payer: COMMERCIAL

## 2025-08-06 DIAGNOSIS — N93.9 ABNORMAL UTERINE BLEEDING (AUB): Primary | ICD-10-CM

## 2025-08-06 DIAGNOSIS — N94.6 DYSMENORRHEA: ICD-10-CM

## 2025-08-06 DIAGNOSIS — Z30.2 REQUEST FOR STERILIZATION: ICD-10-CM

## 2025-08-06 RX ORDER — SODIUM CHLORIDE 9 MG/ML
40 INJECTION, SOLUTION INTRAVENOUS AS NEEDED
OUTPATIENT
Start: 2025-08-06 | End: 2025-08-06

## 2025-08-06 RX ORDER — SODIUM CHLORIDE 0.9 % (FLUSH) 0.9 %
3 SYRINGE (ML) INJECTION EVERY 12 HOURS SCHEDULED
OUTPATIENT
Start: 2025-08-06

## 2025-08-06 RX ORDER — SODIUM CHLORIDE 0.9 % (FLUSH) 0.9 %
10 SYRINGE (ML) INJECTION AS NEEDED
OUTPATIENT
Start: 2025-08-06

## 2025-08-06 RX ORDER — SODIUM CHLORIDE, SODIUM LACTATE, POTASSIUM CHLORIDE, CALCIUM CHLORIDE 600; 310; 30; 20 MG/100ML; MG/100ML; MG/100ML; MG/100ML
150 INJECTION, SOLUTION INTRAVENOUS CONTINUOUS
OUTPATIENT
Start: 2025-08-06 | End: 2025-08-06

## 2025-08-06 RX ORDER — ONDANSETRON 2 MG/ML
4 INJECTION INTRAMUSCULAR; INTRAVENOUS EVERY 6 HOURS PRN
OUTPATIENT
Start: 2025-08-06

## (undated) DEVICE — TISSUE RETRIEVAL SYSTEM: Brand: INZII RETRIEVAL SYSTEM

## (undated) DEVICE — SLV SCD KN/LEN ADJ EXPRSS BLENDED MD 1P/U

## (undated) DEVICE — LAPAROSCOPIC SCISSORS: Brand: EPIX LAPAROSCOPIC SCISSORS

## (undated) DEVICE — PENCL E/S HNDSWCH ROCKR CB

## (undated) DEVICE — SOL IRR NACL 0.9PCT BT 1000ML

## (undated) DEVICE — 2, DISPOSABLE SUCTION/IRRIGATOR WITHOUT DISPOSABLE TIP: Brand: STRYKEFLOW

## (undated) DEVICE — GOWN,REINFRCE,POLY,SIRUS,BREATH SLV,XXLG: Brand: MEDLINE

## (undated) DEVICE — ENDOPATH PNEUMONEEDLE INSUFFLATION NEEDLES WITH LUER LOCK CONNECTORS 120MM: Brand: ENDOPATH

## (undated) DEVICE — NON-WOVEN ADHESIVE WOUND DRESSING: Brand: PRIMAPORE ADHESIVE DRESSING 10*8CM

## (undated) DEVICE — ENDOPATH XCEL WITH OPTIVIEW TECHNOLOGY BLADELESS TROCARS WITH STABILITY SLEEVES: Brand: ENDOPATH XCEL OPTIVIEW

## (undated) DEVICE — 30977 SEE SHARP - ENHANCED INTRAOPERATIVE LAPAROSCOPE CLEANING & DEFOGGING: Brand: 30977 SEE SHARP - ENHANCED INTRAOPERATIVE LAPAROSCOPE CLEANING & DEFOGGING

## (undated) DEVICE — GLV SURG SENSICARE PI ORTHO SZ8 LF STRL

## (undated) DEVICE — NON-WOVEN ADHESIVE WOUND DRESSING: Brand: PRIMAPORE ADHESIVE WOUND DRSG 7.2*5CM

## (undated) DEVICE — SYS CLOSE PORTII CARTR/THOMASN XL

## (undated) DEVICE — 3M™ STERI-STRIP™ REINFORCED ADHESIVE SKIN CLOSURES, R1547, 1/2 IN X 4 IN (12 MM X 100 MM), 6 STRIPS/ENVELOPE: Brand: 3M™ STERI-STRIP™

## (undated) DEVICE — INTENDED FOR TISSUE SEPARATION, AND OTHER PROCEDURES THAT REQUIRE A SHARP SURGICAL BLADE TO PUNCTURE OR CUT.: Brand: BARD-PARKER ® CARBON RIB-BACK BLADES

## (undated) DEVICE — LAPAROSCOPIC WIRE J-HOOK ELECTRODE COATED: Brand: CLEANCOAT

## (undated) DEVICE — STERILE POLYISOPRENE POWDER-FREE SURGICAL GLOVES WITH EMOLLIENT COATING: Brand: PROTEXIS

## (undated) DEVICE — SOL IRR NACL 0.9PCT 3000ML

## (undated) DEVICE — GENERAL LAPAROSCOPY-LF: Brand: MEDLINE INDUSTRIES, INC.

## (undated) DEVICE — ANTIBACTERIAL UNDYED BRAIDED (POLYGLACTIN 910), SYNTHETIC ABSORBABLE SUTURE: Brand: COATED VICRYL

## (undated) DEVICE — SUT MERSILENE POLYSTR CT1 BR 0 75CM GRN

## (undated) DEVICE — ENDOPATH XCEL WITH OPTIVIEW TECHNOLOGY UNIVERSAL TROCAR STABILITY SLEEVES: Brand: ENDOPATH XCEL OPTIVIEW